# Patient Record
Sex: MALE | Race: WHITE | NOT HISPANIC OR LATINO | Employment: OTHER | URBAN - METROPOLITAN AREA
[De-identification: names, ages, dates, MRNs, and addresses within clinical notes are randomized per-mention and may not be internally consistent; named-entity substitution may affect disease eponyms.]

---

## 2018-12-10 ENCOUNTER — OFFICE VISIT (OUTPATIENT)
Dept: FAMILY MEDICINE CLINIC | Facility: CLINIC | Age: 28
End: 2018-12-10
Payer: COMMERCIAL

## 2018-12-10 VITALS
BODY MASS INDEX: 37.53 KG/M2 | SYSTOLIC BLOOD PRESSURE: 128 MMHG | HEART RATE: 89 BPM | OXYGEN SATURATION: 98 % | WEIGHT: 253.4 LBS | DIASTOLIC BLOOD PRESSURE: 82 MMHG | HEIGHT: 69 IN | TEMPERATURE: 98.3 F | RESPIRATION RATE: 16 BRPM

## 2018-12-10 DIAGNOSIS — M54.50 CHRONIC BILATERAL LOW BACK PAIN WITHOUT SCIATICA: ICD-10-CM

## 2018-12-10 DIAGNOSIS — G89.29 CHRONIC BILATERAL LOW BACK PAIN WITHOUT SCIATICA: ICD-10-CM

## 2018-12-10 DIAGNOSIS — F51.01 PRIMARY INSOMNIA: ICD-10-CM

## 2018-12-10 DIAGNOSIS — Z00.00 WELL ADULT EXAM: Primary | ICD-10-CM

## 2018-12-10 DIAGNOSIS — J30.2 SEASONAL ALLERGIES: ICD-10-CM

## 2018-12-10 DIAGNOSIS — Z23 IMMUNIZATION DUE: ICD-10-CM

## 2018-12-10 PROBLEM — R79.1 SUBTHERAPEUTIC INTERNATIONAL NORMALIZED RATIO (INR): Status: ACTIVE | Noted: 2018-12-10

## 2018-12-10 PROBLEM — F70 MILDLY MENTALLY RETARDED: Status: ACTIVE | Noted: 2018-12-10

## 2018-12-10 PROBLEM — R79.1 SUBTHERAPEUTIC INTERNATIONAL NORMALIZED RATIO (INR): Status: RESOLVED | Noted: 2018-12-10 | Resolved: 2018-12-10

## 2018-12-10 PROCEDURE — 90471 IMMUNIZATION ADMIN: CPT

## 2018-12-10 PROCEDURE — 3008F BODY MASS INDEX DOCD: CPT | Performed by: NURSE PRACTITIONER

## 2018-12-10 PROCEDURE — 4004F PT TOBACCO SCREEN RCVD TLK: CPT | Performed by: NURSE PRACTITIONER

## 2018-12-10 PROCEDURE — 90682 RIV4 VACC RECOMBINANT DNA IM: CPT

## 2018-12-10 PROCEDURE — 99214 OFFICE O/P EST MOD 30 MIN: CPT | Performed by: NURSE PRACTITIONER

## 2018-12-10 RX ORDER — MAGNESIUM OXIDE 500 MG
5 TABLET ORAL
COMMUNITY
End: 2018-12-10 | Stop reason: SDUPTHER

## 2018-12-10 RX ORDER — LORATADINE 10 MG/1
10 TABLET ORAL DAILY
Qty: 30 TABLET | Refills: 5 | Status: SHIPPED | OUTPATIENT
Start: 2018-12-10 | End: 2018-12-10 | Stop reason: SDUPTHER

## 2018-12-10 RX ORDER — LORATADINE 10 MG/1
10 TABLET ORAL DAILY
Qty: 30 TABLET | Refills: 0 | Status: SHIPPED | OUTPATIENT
Start: 2018-12-10 | End: 2018-12-11 | Stop reason: SDUPTHER

## 2018-12-10 RX ORDER — LORATADINE 10 MG/1
10 TABLET ORAL DAILY
COMMUNITY
End: 2018-12-10 | Stop reason: SDUPTHER

## 2018-12-10 RX ORDER — MAGNESIUM OXIDE 500 MG
5 TABLET ORAL
Qty: 45 TABLET | Refills: 0 | Status: SHIPPED | OUTPATIENT
Start: 2018-12-10 | End: 2018-12-11 | Stop reason: SDUPTHER

## 2018-12-10 RX ORDER — MAGNESIUM OXIDE 500 MG
5 TABLET ORAL
Qty: 45 TABLET | Refills: 1 | Status: SHIPPED | OUTPATIENT
Start: 2018-12-10 | End: 2018-12-10 | Stop reason: SDUPTHER

## 2018-12-10 NOTE — ASSESSMENT & PLAN NOTE
Has occasional lower back pain that is treatable with Advil    Instructed to take the Advil with food and only as needed every 8 hours

## 2018-12-10 NOTE — ASSESSMENT & PLAN NOTE
Obese adult male who is mentally impaired (mild)  Apical is regular, lungs are clear  Guardians were instructed to have him follow a low-fat low-carbohydrate diet  Increase his activity level  Seek dental care and Vision [facility]

## 2018-12-10 NOTE — ASSESSMENT & PLAN NOTE
Able to answer questions appropriately but immature for his age  Unable to live independent because decision making is poor

## 2018-12-10 NOTE — PROGRESS NOTES
Assessment/Plan:    Well adult exam  Obese adult male who is mentally impaired (mild)  Apical is regular, lungs are clear  Guardians were instructed to have him follow a low-fat low-carbohydrate diet  Increase his activity level  Seek dental care and Vision Care  Mildly mentally retarded  Able to answer questions appropriately but immature for his age  Unable to live independent because decision making is poor  Primary insomnia  Insomnia because he states he cannot turn his mind off will order melatonin 5 mg at bedtime p r n  Seasonal allergies  Symptoms of seasonal allergies are cough and nasal congestion  Will renew Claritin 10 mg to be taken daily  Chronic bilateral low back pain without sciatica  Has occasional lower back pain that is treatable with Advil  Instructed to take the Advil with food and only as needed every 8 hours    Immunization due  Will give flu shot today       Diagnoses and all orders for this visit:    Well adult exam    Chronic bilateral low back pain without sciatica  -     Discontinue: ibuprofen (ADVIL,MOTRIN) 100 MG tablet; Take 1 tablet (100 mg total) by mouth every 8 (eight) hours as needed for moderate pain  -     ibuprofen (ADVIL,MOTRIN) 100 MG tablet; Take 1 tablet (100 mg total) by mouth every 8 (eight) hours as needed for moderate pain    Seasonal allergies  -     Discontinue: loratadine (CLARITIN) 10 mg tablet; Take 1 tablet (10 mg total) by mouth daily  -     loratadine (CLARITIN) 10 mg tablet; Take 1 tablet (10 mg total) by mouth daily    Primary insomnia  -     Discontinue: Melatonin ER 5 MG TBCR; Take 5 mg by mouth daily at bedtime as needed (prn) May repeat dose in one hour if needed  -     Melatonin ER 5 MG TBCR; Take 5 mg by mouth daily at bedtime as needed (prn) May repeat dose in one hour if needed      Immunization due  -     PREFERRED: influenza vaccine, [id number], quadrivalent, recombinant, PF, 0 5 mL (FLUBLOK)    Other orders  -     Discontinue: ibuprofen (ADVIL,MOTRIN) 100 MG tablet; Take 100 mg by mouth every 6 (six) hours as needed  -     Discontinue: loratadine (CLARITIN) 10 mg tablet; Take 10 mg by mouth daily  -     Discontinue: Melatonin ER 5 MG TBCR; Take 5 mg by mouth daily at bedtime as needed          Subjective:      Patient ID: Nciky Mccormick is a 24 y o  male here for physical     HPI  Here for a yearly physical  States he has lower back pain at times  Has seasonal allergies and insomnia  The following portions of the patient's history were reviewed and updated as appropriate:   He  has a past medical history of Allergic; Development delay; and Obesity  He   Patient Active Problem List    Diagnosis Date Noted    Well adult exam 12/10/2018    Primary insomnia 12/10/2018    Chronic bilateral low back pain without sciatica 12/10/2018    Seasonal allergies 12/10/2018    Mildly mentally retarded 12/10/2018    Immunization due 12/10/2018     He  has a past surgical history that includes EAR SURGERY  His family history is not on file  He  reports that he has been smoking  He has been smoking about 1 00 pack per day  He has never used smokeless tobacco  He reports that he does not drink alcohol or use drugs  Current Outpatient Prescriptions   Medication Sig Dispense Refill    ibuprofen (ADVIL,MOTRIN) 100 MG tablet Take 1 tablet (100 mg total) by mouth every 8 (eight) hours as needed for moderate pain 50 tablet 0    loratadine (CLARITIN) 10 mg tablet Take 1 tablet (10 mg total) by mouth daily 30 tablet 0    Melatonin ER 5 MG TBCR Take 5 mg by mouth daily at bedtime as needed (prn) May repeat dose in one hour if needed  45 tablet 0     No current facility-administered medications for this visit  No current outpatient prescriptions on file prior to visit  No current facility-administered medications on file prior to visit  He is allergic to seasonal ic [cholestatin] and penicillins       Review of Systems Constitutional: Negative  HENT:        Allergies cause cough, congestion  Eyes: Positive for visual disturbance (lost glasses  )  Respiratory: Negative  Cardiovascular: Negative  Gastrointestinal: Negative  Genitourinary: Negative  Musculoskeletal: Positive for back pain  Skin: Negative  Allergic/Immunologic: Positive for environmental allergies  Neurological: Positive for headaches (occasional)  Hematological: Negative  Psychiatric/Behavioral: Positive for decreased concentration, dysphoric mood and sleep disturbance  The patient is nervous/anxious  Objective:      /82   Pulse 89   Temp 98 3 °F (36 8 °C) (Tympanic)   Resp 16   Ht 5' 9" (1 753 m)   Wt 115 kg (253 lb 6 4 oz)   SpO2 98%   BMI 37 42 kg/m²          Physical Exam   Constitutional: He is oriented to person, place, and time  He appears well-developed and well-nourished  HENT:   Head: Normocephalic  Right Ear: External ear normal    Left Ear: External ear normal    Nose: Nose normal    Mouth/Throat: Oropharynx is clear and moist    Multiple teeth are rotted to the gumline, poor dental hygiene   Eyes: Pupils are equal, round, and reactive to light  Conjunctivae and EOM are normal    Neck: Normal range of motion  Neck supple  No thyromegaly present  Cardiovascular: Normal rate, normal heart sounds and intact distal pulses  Pulmonary/Chest: Effort normal and breath sounds normal    Abdominal: Soft  Musculoskeletal: Normal range of motion  Lymphadenopathy:     He has no cervical adenopathy  Neurological: He is alert and oriented to person, place, and time  He has normal reflexes  Skin: Skin is warm and dry  Psychiatric: Judgment and thought content normal  His affect is inappropriate  His speech is delayed  He is slowed   Cognition and memory are normal    Mildly mentally impaired adult male

## 2018-12-10 NOTE — ASSESSMENT & PLAN NOTE
Symptoms of seasonal allergies are cough and nasal congestion  Will renew Claritin 10 mg to be taken daily

## 2018-12-11 DIAGNOSIS — F51.01 PRIMARY INSOMNIA: ICD-10-CM

## 2018-12-11 DIAGNOSIS — M54.50 CHRONIC BILATERAL LOW BACK PAIN WITHOUT SCIATICA: ICD-10-CM

## 2018-12-11 DIAGNOSIS — J30.2 SEASONAL ALLERGIES: ICD-10-CM

## 2018-12-11 DIAGNOSIS — G89.29 CHRONIC BILATERAL LOW BACK PAIN WITHOUT SCIATICA: ICD-10-CM

## 2018-12-12 ENCOUNTER — TELEPHONE (OUTPATIENT)
Dept: FAMILY MEDICINE CLINIC | Facility: CLINIC | Age: 28
End: 2018-12-12

## 2018-12-12 DIAGNOSIS — F51.01 PRIMARY INSOMNIA: ICD-10-CM

## 2018-12-12 DIAGNOSIS — G89.29 CHRONIC BILATERAL LOW BACK PAIN WITHOUT SCIATICA: ICD-10-CM

## 2018-12-12 DIAGNOSIS — J30.2 SEASONAL ALLERGIES: ICD-10-CM

## 2018-12-12 DIAGNOSIS — M54.50 CHRONIC BILATERAL LOW BACK PAIN WITHOUT SCIATICA: ICD-10-CM

## 2018-12-12 RX ORDER — LORATADINE 10 MG/1
10 TABLET ORAL DAILY
Qty: 30 TABLET | Refills: 0 | Status: SHIPPED | OUTPATIENT
Start: 2018-12-12 | End: 2019-01-08 | Stop reason: SDUPTHER

## 2018-12-12 RX ORDER — LORATADINE 10 MG/1
10 TABLET ORAL DAILY
Qty: 30 TABLET | Refills: 5 | Status: SHIPPED | OUTPATIENT
Start: 2018-12-12 | End: 2018-12-12 | Stop reason: SDUPTHER

## 2018-12-12 RX ORDER — MAGNESIUM OXIDE 500 MG
5 TABLET ORAL
Qty: 45 TABLET | Refills: 0 | Status: SHIPPED | OUTPATIENT
Start: 2018-12-12 | End: 2018-12-12 | Stop reason: SDUPTHER

## 2018-12-12 RX ORDER — MAGNESIUM OXIDE 500 MG
5 TABLET ORAL
Qty: 45 TABLET | Refills: 0 | Status: SHIPPED | OUTPATIENT
Start: 2018-12-12 | End: 2021-04-15 | Stop reason: HOSPADM

## 2018-12-12 RX ORDER — MAGNESIUM OXIDE 500 MG
5 TABLET ORAL
Qty: 45 TABLET | Refills: 1 | Status: SHIPPED | OUTPATIENT
Start: 2018-12-12 | End: 2018-12-12 | Stop reason: SDUPTHER

## 2018-12-12 RX ORDER — LORATADINE 10 MG/1
10 TABLET ORAL DAILY
Qty: 30 TABLET | Refills: 0 | Status: SHIPPED | OUTPATIENT
Start: 2018-12-12 | End: 2018-12-12 | Stop reason: SDUPTHER

## 2018-12-12 NOTE — TELEPHONE ENCOUNTER
Call from [facility]   As of today [facility] pharmacy has not yet received any of his scripts    They are asking these be filled today

## 2019-01-08 DIAGNOSIS — J30.2 SEASONAL ALLERGIES: ICD-10-CM

## 2019-01-09 DIAGNOSIS — J30.2 SEASONAL ALLERGIES: ICD-10-CM

## 2019-01-09 RX ORDER — LORATADINE 10 MG/1
10 TABLET ORAL DAILY
Qty: 30 TABLET | Refills: 0 | Status: SHIPPED | OUTPATIENT
Start: 2019-01-09 | End: 2021-04-15 | Stop reason: HOSPADM

## 2019-01-09 RX ORDER — LORATADINE 10 MG/1
TABLET ORAL
Qty: 30 TABLET | Refills: 0 | Status: SHIPPED | OUTPATIENT
Start: 2019-01-09 | End: 2021-04-15 | Stop reason: HOSPADM

## 2020-05-18 ENCOUNTER — HOSPITAL ENCOUNTER (EMERGENCY)
Facility: HOSPITAL | Age: 30
Discharge: HOME | End: 2020-05-19
Attending: EMERGENCY MEDICINE | Admitting: EMERGENCY MEDICINE
Payer: COMMERCIAL

## 2020-05-18 DIAGNOSIS — F15.10 METHAMPHETAMINE ABUSE (HCC): ICD-10-CM

## 2020-05-18 DIAGNOSIS — F19.10 SUBSTANCE ABUSE (HCC): Primary | ICD-10-CM

## 2020-05-18 LAB — ETHANOL EXG-MCNC: 0 MG/DL

## 2020-05-18 PROCEDURE — 99282 EMERGENCY DEPT VISIT SF MDM: CPT | Performed by: EMERGENCY MEDICINE

## 2020-05-18 PROCEDURE — 82075 ASSAY OF BREATH ETHANOL: CPT

## 2020-05-18 PROCEDURE — 99285 EMERGENCY DEPT VISIT HI MDM: CPT

## 2020-05-18 RX ORDER — BUSPIRONE HYDROCHLORIDE 30 MG/1
30 TABLET ORAL 3 TIMES DAILY
Status: ON HOLD | COMMUNITY
End: 2021-04-12 | Stop reason: ALTCHOICE

## 2020-05-18 RX ORDER — QUETIAPINE FUMARATE 400 MG/1
400 TABLET, FILM COATED ORAL
COMMUNITY

## 2020-05-19 VITALS
SYSTOLIC BLOOD PRESSURE: 97 MMHG | OXYGEN SATURATION: 97 % | RESPIRATION RATE: 14 BRPM | DIASTOLIC BLOOD PRESSURE: 58 MMHG | TEMPERATURE: 98 F | HEART RATE: 64 BPM

## 2020-05-19 PROCEDURE — NC001 PR NO CHARGE: Performed by: PHYSICIAN ASSISTANT

## 2021-04-12 ENCOUNTER — TELEPHONE (OUTPATIENT)
Dept: FAMILY MEDICINE CLINIC | Facility: CLINIC | Age: 31
End: 2021-04-12

## 2021-04-12 ENCOUNTER — APPOINTMENT (EMERGENCY)
Dept: RADIOLOGY | Facility: HOSPITAL | Age: 31
DRG: 816 | End: 2021-04-12
Payer: COMMERCIAL

## 2021-04-12 ENCOUNTER — HOSPITAL ENCOUNTER (INPATIENT)
Facility: HOSPITAL | Age: 31
LOS: 3 days | Discharge: OTHER INSTITUTION | DRG: 816 | End: 2021-04-15
Attending: EMERGENCY MEDICINE | Admitting: EMERGENCY MEDICINE
Payer: COMMERCIAL

## 2021-04-12 DIAGNOSIS — R79.89 ELEVATED LFTS: ICD-10-CM

## 2021-04-12 DIAGNOSIS — F11.90 OPIOID USE DISORDER: Primary | ICD-10-CM

## 2021-04-12 DIAGNOSIS — T50.901A OVERDOSE: ICD-10-CM

## 2021-04-12 DIAGNOSIS — R74.01 TRANSAMINITIS: ICD-10-CM

## 2021-04-12 DIAGNOSIS — G47.00 INSOMNIA, UNSPECIFIED TYPE: ICD-10-CM

## 2021-04-12 DIAGNOSIS — F41.9 ANXIETY: ICD-10-CM

## 2021-04-12 DIAGNOSIS — F19.10 SUBSTANCE ABUSE (HCC): ICD-10-CM

## 2021-04-12 DIAGNOSIS — R76.8 HEPATITIS C ANTIBODY TEST POSITIVE: ICD-10-CM

## 2021-04-12 PROBLEM — S09.90XA HEAD INJURY: Status: ACTIVE | Noted: 2021-04-12

## 2021-04-12 PROBLEM — F11.23 OPIOID WITHDRAWAL (HCC): Status: ACTIVE | Noted: 2021-04-12

## 2021-04-12 PROBLEM — F19.90 SUBSTANCE USE DISORDER: Status: ACTIVE | Noted: 2021-04-12

## 2021-04-12 PROBLEM — T40.2X1A OPIOID OVERDOSE (HCC): Status: ACTIVE | Noted: 2021-04-12

## 2021-04-12 LAB
ALBUMIN SERPL BCP-MCNC: 3.1 G/DL (ref 3–5.2)
ALBUMIN SERPL BCP-MCNC: 3.4 G/DL (ref 3–5.2)
ALBUMIN SERPL BCP-MCNC: 4.1 G/DL (ref 3–5.2)
ALP SERPL-CCNC: 62 U/L (ref 43–122)
ALP SERPL-CCNC: 74 U/L (ref 43–122)
ALP SERPL-CCNC: 79 U/L (ref 43–122)
ALT SERPL W P-5'-P-CCNC: 397 U/L
ALT SERPL W P-5'-P-CCNC: 451 U/L
ALT SERPL W P-5'-P-CCNC: 569 U/L
ANION GAP SERPL CALCULATED.3IONS-SCNC: 3 MMOL/L (ref 5–14)
ANION GAP SERPL CALCULATED.3IONS-SCNC: 4 MMOL/L (ref 5–14)
ANION GAP SERPL CALCULATED.3IONS-SCNC: 7 MMOL/L (ref 5–14)
APAP SERPL-MCNC: <10 UG/ML (ref 10–20)
AST SERPL W P-5'-P-CCNC: 201 U/L (ref 17–59)
AST SERPL W P-5'-P-CCNC: 263 U/L (ref 17–59)
AST SERPL W P-5'-P-CCNC: 457 U/L (ref 17–59)
ATRIAL RATE: 83 BPM
BASOPHILS # BLD AUTO: 0 THOUSANDS/ΜL (ref 0–0.1)
BASOPHILS # BLD AUTO: 0.1 THOUSANDS/ΜL (ref 0–0.1)
BASOPHILS NFR BLD AUTO: 0 % (ref 0–1)
BASOPHILS NFR BLD AUTO: 1 % (ref 0–1)
BILIRUB SERPL-MCNC: 0.95 MG/DL
BILIRUB SERPL-MCNC: 1.54 MG/DL
BILIRUB SERPL-MCNC: 2.77 MG/DL
BUN SERPL-MCNC: 12 MG/DL (ref 5–25)
BUN SERPL-MCNC: 12 MG/DL (ref 5–25)
BUN SERPL-MCNC: 13 MG/DL (ref 5–25)
CALCIUM ALBUM COR SERPL-MCNC: 9.2 MG/DL (ref 8.3–10.1)
CALCIUM ALBUM COR SERPL-MCNC: 9.5 MG/DL (ref 8.3–10.1)
CALCIUM SERPL-MCNC: 8.5 MG/DL (ref 8.4–10.2)
CALCIUM SERPL-MCNC: 8.8 MG/DL (ref 8.4–10.2)
CALCIUM SERPL-MCNC: 9 MG/DL (ref 8.4–10.2)
CHLORIDE SERPL-SCNC: 102 MMOL/L (ref 97–108)
CHLORIDE SERPL-SCNC: 104 MMOL/L (ref 97–108)
CHLORIDE SERPL-SCNC: 98 MMOL/L (ref 97–108)
CK MB SERPL-MCNC: 10.6 NG/ML (ref 0–2.4)
CK MB SERPL-MCNC: <1 % (ref 0–2.5)
CK SERPL-CCNC: 1134 U/L (ref 55–170)
CO2 SERPL-SCNC: 30 MMOL/L (ref 22–30)
CO2 SERPL-SCNC: 30 MMOL/L (ref 22–30)
CO2 SERPL-SCNC: 32 MMOL/L (ref 22–30)
CREAT SERPL-MCNC: 0.78 MG/DL (ref 0.7–1.5)
CREAT SERPL-MCNC: 0.88 MG/DL (ref 0.7–1.5)
CREAT SERPL-MCNC: 0.96 MG/DL (ref 0.7–1.5)
EOSINOPHIL # BLD AUTO: 0.1 THOUSAND/ΜL (ref 0–0.4)
EOSINOPHIL # BLD AUTO: 0.2 THOUSAND/ΜL (ref 0–0.4)
EOSINOPHIL NFR BLD AUTO: 2 % (ref 0–6)
EOSINOPHIL NFR BLD AUTO: 2 % (ref 0–6)
ERYTHROCYTE [DISTWIDTH] IN BLOOD BY AUTOMATED COUNT: 14.6 %
ERYTHROCYTE [DISTWIDTH] IN BLOOD BY AUTOMATED COUNT: 14.6 %
GFR SERPL CREATININE-BSD FRML MDRD: 107 ML/MIN/1.73SQ M
GFR SERPL CREATININE-BSD FRML MDRD: 117 ML/MIN/1.73SQ M
GFR SERPL CREATININE-BSD FRML MDRD: 123 ML/MIN/1.73SQ M
GLUCOSE SERPL-MCNC: 105 MG/DL (ref 70–99)
GLUCOSE SERPL-MCNC: 117 MG/DL (ref 70–99)
GLUCOSE SERPL-MCNC: 92 MG/DL (ref 70–99)
HCT VFR BLD AUTO: 38 % (ref 41–53)
HCT VFR BLD AUTO: 40.6 % (ref 41–53)
HGB BLD-MCNC: 12.7 G/DL (ref 13.5–17.5)
HGB BLD-MCNC: 13.9 G/DL (ref 13.5–17.5)
HIV 1+2 AB+HIV1 P24 AG SERPL QL IA: NORMAL
HIV1 P24 AG SER QL: NORMAL
INR PPP: 1.07 (ref 0.84–1.19)
INR PPP: 1.12 (ref 0.84–1.19)
LYMPHOCYTES # BLD AUTO: 1.3 THOUSANDS/ΜL (ref 0.5–4)
LYMPHOCYTES # BLD AUTO: 1.4 THOUSANDS/ΜL (ref 0.5–4)
LYMPHOCYTES NFR BLD AUTO: 15 % (ref 25–45)
LYMPHOCYTES NFR BLD AUTO: 17 % (ref 25–45)
MCH RBC QN AUTO: 30.6 PG (ref 26–34)
MCH RBC QN AUTO: 31.1 PG (ref 26–34)
MCHC RBC AUTO-ENTMCNC: 33.5 G/DL (ref 31–36)
MCHC RBC AUTO-ENTMCNC: 34.2 G/DL (ref 31–36)
MCV RBC AUTO: 91 FL (ref 80–100)
MCV RBC AUTO: 91 FL (ref 80–100)
MONOCYTES # BLD AUTO: 1 THOUSAND/ΜL (ref 0.2–0.9)
MONOCYTES # BLD AUTO: 1 THOUSAND/ΜL (ref 0.2–0.9)
MONOCYTES NFR BLD AUTO: 12 % (ref 1–10)
MONOCYTES NFR BLD AUTO: 12 % (ref 1–10)
NEUTROPHILS # BLD AUTO: 5.7 THOUSANDS/ΜL (ref 1.8–7.8)
NEUTROPHILS # BLD AUTO: 6.2 THOUSANDS/ΜL (ref 1.8–7.8)
NEUTS SEG NFR BLD AUTO: 69 % (ref 45–65)
NEUTS SEG NFR BLD AUTO: 71 % (ref 45–65)
P AXIS: 55 DEGREES
PLATELET # BLD AUTO: 193 THOUSANDS/UL (ref 150–450)
PLATELET # BLD AUTO: 211 THOUSANDS/UL (ref 150–450)
PMV BLD AUTO: 8.6 FL (ref 8.9–12.7)
PMV BLD AUTO: 8.6 FL (ref 8.9–12.7)
POTASSIUM SERPL-SCNC: 3.9 MMOL/L (ref 3.6–5)
POTASSIUM SERPL-SCNC: 4.1 MMOL/L (ref 3.6–5)
POTASSIUM SERPL-SCNC: 4.2 MMOL/L (ref 3.6–5)
PR INTERVAL: 130 MS
PROT SERPL-MCNC: 6 G/DL (ref 5.9–8.4)
PROT SERPL-MCNC: 6.1 G/DL (ref 5.9–8.4)
PROT SERPL-MCNC: 7.8 G/DL (ref 5.9–8.4)
PROTHROMBIN TIME: 14 SECONDS (ref 11.6–14.5)
PROTHROMBIN TIME: 14.5 SECONDS (ref 11.6–14.5)
QRS AXIS: 60 DEGREES
QRSD INTERVAL: 84 MS
QT INTERVAL: 352 MS
QTC INTERVAL: 413 MS
RBC # BLD AUTO: 4.15 MILLION/UL (ref 4.5–5.9)
RBC # BLD AUTO: 4.47 MILLION/UL (ref 4.5–5.9)
SALICYLATES SERPL-MCNC: <1 MG/DL (ref 10–30)
SODIUM SERPL-SCNC: 135 MMOL/L (ref 137–147)
SODIUM SERPL-SCNC: 137 MMOL/L (ref 137–147)
SODIUM SERPL-SCNC: 138 MMOL/L (ref 137–147)
T WAVE AXIS: 28 DEGREES
TROPONIN I SERPL-MCNC: 0.02 NG/ML (ref 0–0.03)
VENTRICULAR RATE: 83 BPM
WBC # BLD AUTO: 8.4 THOUSAND/UL (ref 4.5–11)
WBC # BLD AUTO: 8.7 THOUSAND/UL (ref 4.5–11)

## 2021-04-12 PROCEDURE — 80179 DRUG ASSAY SALICYLATE: CPT | Performed by: EMERGENCY MEDICINE

## 2021-04-12 PROCEDURE — 93005 ELECTROCARDIOGRAM TRACING: CPT

## 2021-04-12 PROCEDURE — 85025 COMPLETE CBC W/AUTO DIFF WBC: CPT | Performed by: EMERGENCY MEDICINE

## 2021-04-12 PROCEDURE — 82553 CREATINE MB FRACTION: CPT | Performed by: EMERGENCY MEDICINE

## 2021-04-12 PROCEDURE — 85610 PROTHROMBIN TIME: CPT | Performed by: EMERGENCY MEDICINE

## 2021-04-12 PROCEDURE — 99284 EMERGENCY DEPT VISIT MOD MDM: CPT | Performed by: EMERGENCY MEDICINE

## 2021-04-12 PROCEDURE — 99291 CRITICAL CARE FIRST HOUR: CPT | Performed by: PHYSICIAN ASSISTANT

## 2021-04-12 PROCEDURE — 82550 ASSAY OF CK (CPK): CPT | Performed by: EMERGENCY MEDICINE

## 2021-04-12 PROCEDURE — 93010 ELECTROCARDIOGRAM REPORT: CPT | Performed by: INTERNAL MEDICINE

## 2021-04-12 PROCEDURE — 99285 EMERGENCY DEPT VISIT HI MDM: CPT

## 2021-04-12 PROCEDURE — 87806 HIV AG W/HIV1&2 ANTB W/OPTIC: CPT | Performed by: PHYSICIAN ASSISTANT

## 2021-04-12 PROCEDURE — 80074 ACUTE HEPATITIS PANEL: CPT | Performed by: EMERGENCY MEDICINE

## 2021-04-12 PROCEDURE — 84484 ASSAY OF TROPONIN QUANT: CPT | Performed by: EMERGENCY MEDICINE

## 2021-04-12 PROCEDURE — NC001 PR NO CHARGE: Performed by: PHYSICIAN ASSISTANT

## 2021-04-12 PROCEDURE — 71045 X-RAY EXAM CHEST 1 VIEW: CPT

## 2021-04-12 PROCEDURE — 80143 DRUG ASSAY ACETAMINOPHEN: CPT | Performed by: EMERGENCY MEDICINE

## 2021-04-12 PROCEDURE — 80053 COMPREHEN METABOLIC PANEL: CPT | Performed by: EMERGENCY MEDICINE

## 2021-04-12 PROCEDURE — 36415 COLL VENOUS BLD VENIPUNCTURE: CPT | Performed by: EMERGENCY MEDICINE

## 2021-04-12 RX ORDER — NALOXONE HYDROCHLORIDE 1 MG/ML
3 INJECTION PARENTERAL ONCE
Status: COMPLETED | OUTPATIENT
Start: 2021-04-12 | End: 2021-04-12

## 2021-04-12 RX ORDER — SODIUM CHLORIDE 9 MG/ML
125 INJECTION, SOLUTION INTRAVENOUS CONTINUOUS
Status: DISCONTINUED | OUTPATIENT
Start: 2021-04-12 | End: 2021-04-14

## 2021-04-12 RX ORDER — IBUPROFEN 400 MG/1
600 TABLET ORAL EVERY 6 HOURS PRN
Status: DISCONTINUED | OUTPATIENT
Start: 2021-04-12 | End: 2021-04-15 | Stop reason: HOSPADM

## 2021-04-12 RX ORDER — GABAPENTIN 300 MG/1
300 CAPSULE ORAL EVERY 8 HOURS PRN
Status: DISCONTINUED | OUTPATIENT
Start: 2021-04-12 | End: 2021-04-15 | Stop reason: HOSPADM

## 2021-04-12 RX ORDER — BUSPIRONE HYDROCHLORIDE 15 MG/1
15 TABLET ORAL 2 TIMES DAILY
COMMUNITY
End: 2021-04-15 | Stop reason: HOSPADM

## 2021-04-12 RX ORDER — ONDANSETRON 4 MG/1
4 TABLET, ORALLY DISINTEGRATING ORAL EVERY 6 HOURS PRN
Status: DISCONTINUED | OUTPATIENT
Start: 2021-04-12 | End: 2021-04-15 | Stop reason: HOSPADM

## 2021-04-12 RX ORDER — CLONIDINE HYDROCHLORIDE 0.1 MG/1
0.1 TABLET ORAL EVERY 6 HOURS PRN
Status: DISCONTINUED | OUTPATIENT
Start: 2021-04-12 | End: 2021-04-15 | Stop reason: HOSPADM

## 2021-04-12 RX ORDER — ALPRAZOLAM 0.5 MG/1
0.5 TABLET ORAL DAILY
Status: DISCONTINUED | OUTPATIENT
Start: 2021-04-12 | End: 2021-04-12

## 2021-04-12 RX ORDER — IBUPROFEN 400 MG/1
400 TABLET ORAL EVERY 4 HOURS PRN
Status: DISCONTINUED | OUTPATIENT
Start: 2021-04-12 | End: 2021-04-12

## 2021-04-12 RX ORDER — ALPRAZOLAM 0.5 MG/1
0.5 TABLET ORAL 2 TIMES DAILY
Status: DISCONTINUED | OUTPATIENT
Start: 2021-04-12 | End: 2021-04-15 | Stop reason: HOSPADM

## 2021-04-12 RX ORDER — TRAZODONE HYDROCHLORIDE 100 MG/1
10 TABLET ORAL
Status: ON HOLD | COMMUNITY
End: 2021-04-15 | Stop reason: SDUPTHER

## 2021-04-12 RX ADMIN — ALPRAZOLAM 0.5 MG: 0.5 TABLET ORAL at 21:22

## 2021-04-12 RX ADMIN — ACETYLCYSTEINE 5000 MG: 6 INJECTION, SOLUTION INTRAVENOUS at 12:36

## 2021-04-12 RX ADMIN — IBUPROFEN 600 MG: 400 TABLET ORAL at 07:52

## 2021-04-12 RX ADMIN — ACETYLCYSTEINE 10000 MG: 6 INJECTION, SOLUTION INTRAVENOUS at 17:11

## 2021-04-12 RX ADMIN — IBUPROFEN 600 MG: 400 TABLET ORAL at 16:41

## 2021-04-12 RX ADMIN — SODIUM CHLORIDE 125 ML/HR: 0.9 INJECTION, SOLUTION INTRAVENOUS at 21:29

## 2021-04-12 RX ADMIN — SODIUM CHLORIDE 1000 ML: 0.9 INJECTION, SOLUTION INTRAVENOUS at 04:07

## 2021-04-12 RX ADMIN — SODIUM CHLORIDE 125 ML/HR: 0.9 INJECTION, SOLUTION INTRAVENOUS at 14:07

## 2021-04-12 RX ADMIN — ACETYLCYSTEINE 15000 MG: 6 INJECTION, SOLUTION INTRAVENOUS at 11:28

## 2021-04-12 RX ADMIN — ONDANSETRON 4 MG: 4 TABLET, ORALLY DISINTEGRATING ORAL at 16:42

## 2021-04-12 RX ADMIN — IBUPROFEN 600 MG: 400 TABLET ORAL at 23:15

## 2021-04-12 RX ADMIN — ONDANSETRON 4 MG: 4 TABLET, ORALLY DISINTEGRATING ORAL at 10:49

## 2021-04-12 RX ADMIN — SODIUM CHLORIDE 1000 ML: 0.9 INJECTION, SOLUTION INTRAVENOUS at 12:47

## 2021-04-12 RX ADMIN — ALPRAZOLAM 0.5 MG: 0.25 TABLET ORAL at 10:49
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
## 2021-04-12 NOTE — DISCHARGE INSTR - OTHER ORDERS
HOW TO GET SUBSTANCE ABUSE HELP:  If you or someone you know has a drug or alcohol problem, there is help:  Carmela [address]: [address]: [phone]  An assessment is the first step  In addition to those listed there are other programs available in the area but assessment is best to determine an appropriate level of care  If you DO NOT have Medical Assistance (MA) or [employer], an assessment can be scheduled at one of these providers:  [address] [facility] [place] [address], [address] [place]  [phone]   101 [facility] [address] , [place] [address] [place]  3314 [facility] [address]  Kim [address] [place], [address]   Step by [address] [facility] [address] , [place], 98 [facility] Alexis Geoff [address]20 [facility] , [place] 98 [facility]  2000 [facility] [address] , [address], [place], [address] [place] [phone]     If you [address], an assessment can be scheduled at one of these providers:  [facility]  [address] , [place], 98 [facility]  [address]  [address], [address] [place]  [address]  D&A Intake Unit  620 [facility] [address] , 1st Floor, [place], [address]  [address], 300 [facility],6Th Floor, [place], 4420 [facility] [place] [address] [facility] [address] , [place], [address] [place]  3314 [facility] [address]  [place], 122 [facility]  57 [facility], [place], [address] 75 [facility] [address] [place], [address]   Step by [facility] 65 [facility] Valente WINTER'Venus Garcia , [place], 98 [facility] [phone]   [facility]  [address] , [place], 98 [facility]  2000 [facility],[address] 100 [facility] [place] , [address], [place], [address] [facility] [phone]     If you [facility], an assessment can be scheduled at one of these providers  Please contact these Providers to determine if they are in your network plan:  [facility] D&A Intake Unit  620 [facility] [address] , 1st Floor, [place], [address]  [place] [address]ert[address] , [place], [address] [place]  [facility] [address]  [place], 122 [facility]) [facility] [address], [place], [address] 75 [facility] [place] [address] [facility] [address] , [address], [place], [address] [place] 200 [facility] Crisis Phone Number : 401 [address] [place] [address] Crisis Phone Number : [phone]    [place] Suicide Hotline : 1 [phone]    Crisis Text Line : 352823          What you need to know aboutcoronavirus disease 2019 (COVID-19)     What is coronavirus disease 2019 (COVID-19)? Coronavirus disease 2019 (COVID-19) is a respiratory illness that can spread from person to person  The virus that causes COVID-19 is a novel coronavirus that was first identified during an investigation into an outbreak in [place], [place]  Can people in [place]  get COVID-19? Yes  COVID-19 is spreading from person to person in parts of the [place]  Risk of infection with COVID-19 is higher for people who are close contacts of someone known to have COVID-19, for example healthcare workers, or household members  Other people at higher risk for infection are those who live in or have recently been in an area with ongoing spread of COVID-19   Learn more about places with ongoing spread at [facility]  html#geographic  Have there been cases of COVID-19 in [place] ?   Yes  The first case of COVID-19 in the [place] was reported on January 21, 2020  The current count of cases of COVID-19 in the [place] is available on [employer] at [facility]  How does COVID-19 spread? The virus that causes COVID-19 probably emerged from an animal source, but is now spreading from person to person  The virus is thought to spread mainly between people who are in close contact with one another (within about 6 feet) through respiratory droplets produced when an infected person coughs or sneezes  It also may be possible that a person can get COVID-19 by touching a surface or object that has the virus on it and then touching their own mouth, nose, or possibly their eyes, but this is not thought to be the main way the virus spreads  Learn what is known about the spread of newly emerged coronaviruses at [facility]  What are the symptoms of COVID-19? Patients with COVID-19 have had mild to severe respiratory illness with symptoms of   fever   cough   shortness of breath  What are severe complications from this virus? Some patients have pneumonia in both lungs, multi-organ failure and in some cases death  How can I help protect myself? People can help protect themselves from respiratory illness with everyday preventive actions  Avoid close contact with people who are sick  Avoid touching your eyes, nose, and mouth withunwashed hands  Wash your hands often with soap and water for at least 20 seconds  Use an alcohol-based hand  that contains at least 60% alcohol if soap and water are not available  If you are sick, to keep from spreading respiratory illness to others, you should   Stay home when you are sick      Cover your cough or sneeze with a tissue, then throw the tissue in the trash  Clean and disinfect frequently touched objectsand surfaces  What should I do if I recently traveled from an area with ongoing spread of COVID-19? If you have traveled from an affected area, there may be restrictions on your movements for up to 2 weeks  If you develop symptoms during that period (fever, cough, trouble breathing), seek medical advice  Call the office of your health care provider before you go, and tell them about your travel and your symptoms  They will give you instructions on how to get care without exposing other people to your illness  While sick, avoid contact with people, don't go out and delay any travel to reduce the possibility of spreading illness to others  Is there a treatment? There is no specific antiviral treatment for COVID-19  People with COVID-19 can seek medical care to helprelieve symptoms  For more information: www cdc gov/WOKFG45VH 061605-L 03/03/2020       What to do if you are sick withcoronavirus disease 2019 (COVID-19)     If you are sick with COVID-19 or suspect you are infected with the virus that causes COVID-19, follow the steps below to help prevent the disease from spreading to people in your home and community  Stay home except to get medical care   You should restrict activities outside your home, except for getting medical care  Do not go to work, school, or public areas  Avoid using public transportation, ride-sharing, or taxis  Separate yourself from other people and animals inyour home  People: As much as possible, you should stay in a specific room and away from other people in your home  Also, you should use a separate bathroom, if available  Animals: Do not handle pets or other animals while sick  See COVID-19 and Animals for more information    Call ahead before visiting your doctor   If you have a medical appointment, call the healthcare provider and tell them that you have or may have COVID-19  This will help the healthcare provider's office take steps to keep other people from getting infected or exposed  Wear a facemask  You should wear a facemask when you are around other people (e g , sharing a room or vehicle) or pets and before you enter a healthcare provider's office  If you are not able to wear a facemask (for example, because it causes trouble breathing), then people who live with you should not stay in the same room with you, or they should wear a facemask if they enteryour room  Cover your coughs and sneezes   Cover your mouth and nose with a tissue when you cough or sneeze  Throw used tissues in a lined trash can; immediately wash your hands with soap and water for at least 20 seconds or clean your hands with an alcohol-based hand  that contains at least 60 to 95% alcohol, covering all surfaces of your hands and rubbing them together until they feel dry  Soap and water should be used preferentially if hands are visibly dirty  Avoid sharing personal household items   You should not share dishes, drinking glasses, cups, eating utensils, towels, or bedding with other people or pets in your home  After using these items, they should be washed thoroughly with soap and water  Clean your hands often  Wash your hands often with soap and water for at least 20 seconds  If soap and water are not available, clean your hands with an alcohol-based hand  that contains at least 60% alcohol, covering all surfaces of your hands and rubbing them together until they feel dry  Soap and water should be used preferentially if hands are visibly dirty  Avoid touching your eyes, nose, and mouth with unwashed hands  Clean all "high-touch" surfaces every day  High touch surfaces include counters, tabletops, doorknobs, bathroom fixtures, toilets, phones, keyboards, tablets, and bedside tables  Also, clean any surfaces that may have blood, stool, or body fluids on them   Use a household cleaning spray or wipe, according to the label instructions  Labels contain instructions for safe and effective use of the cleaning product including precautions you should take when applying the product, such as wearing gloves and making sure you have good ventilation during use of the product  Monitor your symptoms  Seek prompt medical attention if your illness is worsening (e g , difficulty breathing)  Before seeking care, call your healthcare provider and tell them that you have, or are being evaluated for, COVID-19  Put on a facemask before you enter the facility  These steps will help the healthcare provider's office to keep other people in the office or waiting room from getting infectedor exposed  Ask your healthcare provider to call the local or state health department  Persons who are placed under active monitoring or facilitated self-monitoring should follow instructions provided by their local health department or occupational health professionals, as appropriate  If you have a medical emergency and need to call 911, notify the dispatch personnel that you have, or are being evaluated for COVID-19  If possible, put on a facemask before emergency medical services arrive  Discontinuing home isolation  Patients with confirmed COVID-19 should remain under home isolation precautions until the risk of secondary transmission to others is thought to be low  The decision to discontinue home isolation precautions should be made on a case-by-case basis, in consultation with healthcare providers and state and local health departments  For more information: www cdc gov/FDYHU58TT 498747-J 02/24/2020       Stay home when you are sick,except to get medical care  Wash your hands often with soap and water for at least 20 seconds  Cover your cough or sneeze with a tissue, then throw the tissue in the trash  Clean and disinfect frequently touched objects and surfaces  Avoid touching your eyes, nose, and mouth  STOP THE SPREAD OF GERMS  For more information: www cdc gov/COVID19 Avoid close contact with people who are sick  Help prevent the spread of respiratory diseases like COVID-19  [facility] [place]   COVID-19 CRISIS COUNSELING PROGRAM   GET CONNECTED WITH A FREE CRISIS COUNSELOR   CALL [phone]   Do you feel    Stressed? Overwhelmed? Alone? Afraid? Anxiety? During these uncertain times, you are not alone  We are here to listen  Please call our [facility] and Referral Line   [phone] TTY: [phone]             There are trained professionals available 24/7 ready to help you navigate these unprecedented challenges  These services are FREE & CONFIDENTIAL  This publication was made possible by [employer] Number [id number], in collaboration with the [address] [place] [address]

## 2021-04-12 NOTE — ASSESSMENT & PLAN NOTE
· CMP 04/12/2021:  , , alk phos 79  · Repeat AST//451 on 4/12/21, 216/391 on 4/13/21, trending down  · Acetaminophen level less than 10  · Salicylate level less than 1  · EKG NSR at 83 bpm with   · CXR negative  · Likely due to rhabdomyolysis, rule out hepatitis versus hepatic steatosis   · CK 1,134 with CK-MB 10 6 on 4/12/21,  on 4/13/21,  wnl on 4/14/21  · Given 1L NS bolus and placed on 125 ml/hr NS infusion which has now been discontinued with normal CK  · Acetylcysteine started for unexplained LFT elevation and subsequently discontinued  · Ultrasound- revealed slightly enlarged liver and large gallstone with no evidence of cirrhosis   · Hepatitis panel positive for Hep C antibody  Further workup per GI    · Continue to monitor LFTs

## 2021-04-12 NOTE — ASSESSMENT & PLAN NOTE
· Patient presented to [facility] via EMS  · Patient experienced a drug overdose at home, reportedly of a full syringe of heroin along with 0 5 oz K2 (smoked)  · received 10 mg total of Narcan during EMS transport to hospital  · Patient with chronic history of heroin abuse- reports starting heroin use in December 2020 via IV injection   · Reports daily use of heroin (1 mL/day)  · Increased use recently as treatment for pain   · Follow MAT protocol for opioid withdrawal  · IVDU history- Order HIV testing  · Hepatitis panel pending
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
## 2021-04-12 NOTE — PLAN OF CARE
Problem: Potential for Falls  Goal: Patient will remain free of falls  Description: INTERVENTIONS:  - Assess patient frequently for physical needs  -  Identify cognitive and physical deficits and behaviors that affect risk of falls    -  [place] fall precautions as indicated by assessment   - Educate patient/family on patient safety including physical limitations  - Instruct patient to call for assistance with activity based on assessment  - Modify environment to reduce risk of injury  - Consider OT/PT consult to assist with strengthening/mobility  Outcome: Progressing     Problem: Prexisting or High Potential for Compromised Skin Integrity  Goal: Skin integrity is maintained or improved  Description: INTERVENTIONS:  - Identify patients at risk for skin breakdown  - Assess and monitor skin integrity  - Assess and monitor nutrition and hydration status  - Monitor labs   - Assess for incontinence   - Turn and reposition patient  - Assist with mobility/ambulation  - Relieve pressure over bony prominences  - Avoid friction and shearing  - Provide appropriate hygiene as needed including keeping skin clean and dry  - Evaluate need for skin moisturizer/barrier cream  - Collaborate with interdisciplinary team   - Patient/family teaching  - Consider wound care consult   Outcome: Progressing     Problem: PAIN - ADULT  Goal: Verbalizes/displays adequate comfort level or baseline comfort level  Description: Interventions:  - Encourage patient to monitor pain and request assistance  - Assess pain using appropriate pain scale  - Administer analgesics based on type and severity of pain and evaluate response  - Implement non-pharmacological measures as appropriate and evaluate response  - Consider cultural and social influences on pain and pain management  - Notify physician/advanced practitioner if interventions unsuccessful or patient reports new pain  Outcome: Progressing     Problem: INFECTION - ADULT  Goal: Absence or prevention of progression during hospitalization  Description: INTERVENTIONS:  - Assess and monitor for signs and symptoms of infection  - Monitor lab/diagnostic results  - Monitor all insertion sites, i e  indwelling lines, tubes, and drains  - Monitor endotracheal if appropriate and nasal secretions for changes in amount and color  - [place] appropriate cooling/warming therapies per order  - Administer medications as ordered  - Instruct and encourage patient and family to use good hand hygiene technique  - Identify and instruct in appropriate isolation precautions for identified infection/condition  Outcome: Progressing  Goal: Absence of fever/infection during neutropenic period  Description: INTERVENTIONS:  - Monitor WBC    Outcome: Progressing     Problem: SAFETY ADULT  Goal: Patient will remain free of falls  Description: INTERVENTIONS:  - Assess patient frequently for physical needs  -  Identify cognitive and physical deficits and behaviors that affect risk of falls    -  [place] fall precautions as indicated by assessment   - Educate patient/family on patient safety including physical limitations  - Instruct patient to call for assistance with activity based on assessment  - Modify environment to reduce risk of injury  - Consider OT/PT consult to assist with strengthening/mobility  Outcome: Progressing  Goal: Maintain or return to baseline ADL function  Description: INTERVENTIONS:  -  Assess patient's ability to carry out ADLs; assess patient's baseline for ADL function and identify physical deficits which impact ability to perform ADLs (bathing, care of mouth/teeth, toileting, grooming, dressing, etc )  - Assess/evaluate cause of self-care deficits   - Assess range of motion  - Assess patient's mobility; develop plan if impaired  - Assess patient's need for assistive devices and provide as appropriate  - Encourage maximum independence but intervene and supervise when necessary  - Involve family in performance of ADLs  - Assess for home care needs following discharge   - Consider OT consult to assist with ADL evaluation and planning for discharge  - Provide patient education as appropriate  Outcome: Progressing  Goal: Maintain or return mobility status to optimal level  Description: INTERVENTIONS:  - Assess patient's baseline mobility status (ambulation, transfers, stairs, etc )    - Identify cognitive and physical deficits and behaviors that affect mobility  - Identify mobility aids required to assist with transfers and/or ambulation (gait belt, sit-to-stand, lift, walker, cane, etc )  - [place] fall precautions as indicated by assessment  - Record patient progress and toleration of activity level on Mobility SBAR; progress patient to next Phase/Stage  - Instruct patient to call for assistance with activity based on assessment  - Consider rehabilitation consult to assist with strengthening/weightbearing, etc   Outcome: Progressing     Problem: DISCHARGE PLANNING  Goal: Discharge to home or other facility with appropriate resources  Description: INTERVENTIONS:  - Identify barriers to discharge w/patient and caregiver  - Arrange for needed discharge resources and transportation as appropriate  - Identify discharge learning needs (meds, wound care, etc )  - Arrange for interpretive services to assist at discharge as needed  - Refer to Case Management Department for coordinating discharge planning if the patient needs post-hospital services based on physician/advanced practitioner order or complex needs related to functional status, cognitive ability, or social support system  Outcome: Progressing     Problem: Knowledge Deficit  Goal: Patient/family/caregiver demonstrates understanding of disease process, treatment plan, medications, and discharge instructions  Description: Complete learning assessment and assess knowledge base    Interventions:  - Provide teaching at level of understanding  - Provide teaching via preferred learning methods  Outcome: Progressing

## 2021-04-12 NOTE — MALNUTRITION/BMI
This medical record reflects one or more clinical indicators suggestive of malnutrition and/or morbid obesity  BMI Findings: Body mass index is 40 01 kg/m²  See Nutrition note dated 4/12/21 for additional details  Completed nutrition assessment is viewable in the nutrition documentation

## 2021-04-12 NOTE — UTILIZATION REVIEW
Initial Clinical Review    Admission: Date/Time/Statement:   Admission Orders (From admission, onward)     Ordered        04/12/21 0447  Inpatient Admission  Once                   Orders Placed This Encounter   Procedures    Inpatient Admission     Standing Status:   Standing     Number of Occurrences:   1     Order Specific Question:   Level of Care     Answer:   Level 2 Stepdown / HOT [14]     Order Specific Question:   Bed request comments     Answer:   detox     Order Specific Question:   Estimated length of stay     Answer:   More than 2 Midnights     Order Specific Question:   Certification     Answer:   I certify that inpatient services are medically necessary for this patient for a duration of greater than two midnights  See H&P and MD Progress Notes for additional information about the patient's course of treatment  ED Arrival Information     Expected Arrival Acuity Means of Arrival Escorted By Service Admission Type    - 4/12/2021 03:29 [facility]gent [facility] ([address] [facility]) Medical Toxicology Emergency    Arrival Complaint    Overdose        Chief Complaint   Patient presents with    Overdose - Accidental     Patient found unresponsive on the floor per EMS  Given Narcan by [employer] & EMS  Assessment/Plan: 32 yo m with a pmh of opioid use ( IV heroin, polysubstance abuse, meth, K2,Xanax ) he presented to the Ed, after a drug overdose, friends actually started CPR out of concern for cardiac arrest  He was given 10 mg of Narcan with improvement of symptoms, he had a brief episodes of hypoxia, requiring supplemental O2  It is noted to have elevation in his liver function tests  Acetaminophen levels tested, Pt requested detoxification and rehab  He is admitted inpatient to the Detox unit at [facility], to a level IV medically managed inpatient  Detox unit  4/12 - Pt with complaint of nausea and headache, using Zofran and ibuprofen    Xanax 0 5 mg bid started CPK to 1134 mild Rhabdomyolysis  Started on IVF's after 1 L Bolus  4/13 -  Overnight pt's Sats dropped to low of 79 while asleep, quickly recovers    HOB elevated to 45%, and he was placed on 2 L NC O2       ED Triage Vitals   Temperature Pulse Respirations Blood Pressure SpO2   04/12/21 0338 04/12/21 0338 04/12/21 0338 04/12/21 0338 04/12/21 0338   (!) 96 3 °F (35 7 °C) 80 16 141/82 100 %      Temp Source Heart Rate Source Patient Position - Orthostatic VS BP Location FiO2 (%)   04/12/21 0338 04/12/21 0338 04/12/21 0338 04/12/21 0338 --   Tympanic Monitor Lying Left arm       Pain Score       04/12/21 0522       Worst Possible Pain          Wt Readings from Last 1 Encounters:   04/12/21 134 kg (295 lb)     Additional Vital Signs:   Date/Time  Temp  Pulse  Resp  BP  MAP (mmHg)  SpO2  Calculated FIO2 (%) - Nasal Cannula  Nasal Cannula O2 Flow Rate (L/min)  O2 Device  Patient Position - Orthostatic VS   04/12/21 1111  97 5 °F (36 4 °C)  77  20  107/46Abnormal   66  97 %  --  --  None (Room air)  Lying   04/12/21 0930  --  89  --  --  --  --  --  --  --  Lying   04/12/21 0715  98 °F (36 7 °C)  104  20  131/61  --  97 %  --  --  None (Room air)  Lying   04/12/21 0540  --  --  --  --  --  99 %  --  --  None (Room air)  --   04/12/21 0522  98 4 °F (36 9 °C)  108Abnormal   18  129/75  --  --  --  --  --  Sitting   04/12/21 0430  --  91  18  141/80  --  98 %  --  --  None (Room air)  Lying   04/12/21 0400  --  93  16  141/80  100  98 %  28  2 L/min  Nasal cannula  --   04/12/21 0344  --  --  --  --  --  90 %  28  2 L/min  Nasal cannula       Clinical Opiate Withdrawal Scale    Row Name  04/13/21   0719  04/13/21   0404  04/13/21   0358  04/13/21   0211  04/13/21   0010   Clinical Opiate Withdrawal Scale   Pulse  59  57  61  64  58   Heart Rate Source  Monitor  --  Monitor  Monitor  --   Patient Position - Orthostatic VS  Lying  Lying  Lying  Lying  Lying   Resting Pulse Rate: Measured After Patient is Sitting or Lying for One Minute  -- --  0  0  --   GI Upset: Over Last Half Hour  --  --  0  0  --   Sweating: Over Past Half Hour Not Accounted for by Room Temperature of Patient Activity  --  --  0  0  --   Tremor: Observation of Outstretched Hands  --  --  0  0  --   Restlessness: Observation During Assessment  --  --  0  0  --   Yawning: Observation During Assessment  --  --  0  0  --   Pupil Size  --  --  0  0  --   Anxiety and Irritability  --  --  0  0  --   Bone or Joint Aches: If Patient was Having Pain Previously, Only the Additional Component Attributed to Opiate Withdrawal is Scored  --  --  0  0  --   Gooseflesh Skin  --  --  0  0  --   Runny Nose or Tearing: Not Accounted for by Cold Symptoms or Allergies  --  --  0  0  --   Clinical Opiate Withdrawal Scale Total Score  --  --  0  0  --   Row Name  04/12/21   2254  04/12/21   2223  04/12/21   1936  04/12/21   1858  04/12/21   1730   Clinical Opiate Withdrawal Scale   Pulse  57  57  79  85  83   Heart Rate Source  Monitor  Apical  Monitor  --  Monitor   Patient Position - Orthostatic VS  Lying  Lying  Lying  Lying  Lying   Resting Pulse Rate: Measured After Patient is Sitting or Lying for One Minute  0  --  0  --  1   GI Upset: Over Last Half Hour  0  --  0  --  0   Sweating: Over Past Half Hour Not Accounted for by Room Temperature of Patient Activity  0  --  0  --  0   Tremor: Observation of Outstretched Hands  0  --  0  --  0   Restlessness: Observation During Assessment  0  --  0  --  1   Yawning: Observation During Assessment  0  --  1  --  1   Pupil Size  0  --  0  --  0   Anxiety and Irritability  0  --  0  --  0   Bone or Joint Aches: If Patient was Having Pain Previously, Only the Additional Component Attributed to Opiate Withdrawal is Scored  0  --  1  --  0   Gooseflesh Skin  0  --  0  --  0   Runny Nose or Tearing: Not Accounted for by Cold Symptoms or Allergies  0  --  0  --  0   Clinical Opiate Withdrawal Scale Total Score  0  --  2  --  3   Row Name  04/12/21   3183 04/12/21   1330  04/12/21   1111  04/12/21   0930  04/12/21   0900   Clinical Opiate Withdrawal Scale   Pulse  90  89  77  89  --   Heart Rate Source  Monitor  Monitor  Monitor  Monitor  --   Patient Position - Orthostatic VS  Lying  Lying  Lying  Lying  --   Resting Pulse Rate: Measured After Patient is Sitting or Lying for One Minute  --  1  --  1  --   GI Upset: Over Last Half Hour  --  0  --  0  --   Sweating: Over Past Half Hour Not Accounted for by Room Temperature of Patient Activity  --  0  --  0  --   Tremor: Observation of Outstretched Hands  --  0  --  0  --   Restlessness: Observation During Assessment  --  1  --  3  --   Yawning: Observation During Assessment  --  1  --  0  --   Pupil Size  --  0  --  0  --   Anxiety and Irritability  --  0  --  0  --   Bone or Joint Aches: If Patient was Having Pain Previously, Only the Additional Component Attributed to Opiate Withdrawal is Scored  --  0  --  0  --   Gooseflesh Skin  --  0  --  0  --   Runny Nose or Tearing: Not Accounted for by Cold Symptoms or Allergies  --  0  --  0  --   Clinical Opiate Withdrawal Scale Total Score  --  3  --  4  --   Row Name  04/12/21   0715  04/12/21   0522  04/12/21   0430  04/12/21   0400  04/12/21   0338   Clinical Opiate Withdrawal Scale   Pulse  104  108Abnormal   91  93  80   Heart Rate Source  Monitor  Monitor  Monitor  --  Monitor   Patient Position - Orthostatic VS  Lying  Sitting  Lying  --  Lying   Resting Pulse Rate: Measured After Patient is Sitting or Lying for One Minute  --  2  --  --  --   GI Upset: Over Last Half Hour  --  0  --  --  --   Sweating: Over Past Half Hour Not Accounted for by Room Temperature of Patient Activity  --  0  --  --  --   Tremor: Observation of Outstretched Hands  --  0  --  --  --   Restlessness: Observation During Assessment  --  0  --  --  --   Yawning: Observation During Assessment  --  0  --  --  --   Pupil Size  --  0  --  --  --   Anxiety and Irritability  --  0  --  --  -- Bone or Joint Aches: If Patient was Having Pain Previously, Only the Additional Component Attributed to Opiate Withdrawal is Scored  --  1  --  --  --   Gooseflesh Skin  --  0  --  --  --   Runny Nose or Tearing: Not Accounted for by Cold Symptoms or Allergies  --  0  --  --  --   Clinical Opiate Withdrawal Scale Total Score  --  3  --  --           Pertinent Labs/Diagnostic Test Results:       Results from last 7 days   Lab Units 04/12/21  1045 04/12/21  0347   WBC Thousand/uL 8 40 8 70   HEMOGLOBIN g/dL 12 7* 13 9   HEMATOCRIT % 38 0* 40 6*   PLATELETS Thousands/uL 193 211   NEUTROS ABS Thousands/µL 5 70 6 20         Results from last 7 days   Lab Units 04/12/21  0347   SODIUM mmol/L 135*   POTASSIUM mmol/L 4 1   CHLORIDE mmol/L 98   CO2 mmol/L 30   ANION GAP mmol/L 7   BUN mg/dL 13   CREATININE mg/dL 0 96   EGFR ml/min/1 73sq m 107   CALCIUM mg/dL 8 8     Results from last 7 days   Lab Units 04/12/21  0347   AST U/L 457*   ALT U/L 569*   ALK PHOS U/L 79   TOTAL PROTEIN g/dL 7 8   ALBUMIN g/dL 4 1   TOTAL BILIRUBIN mg/dL 2 77*     Results from last 7 days   Lab Units 04/12/21  0347   GLUCOSE RANDOM mg/dL 117*       Results from last 7 days   Lab Units 04/12/21  0347   TROPONIN I ng/mL 0 02     Results from last 7 days   Lab Units 04/12/21  0347   ACETAMINOPHEN LVL ug/mL <10*     CXR 4/12 -  No acute  ECG  Collection Time Result Time Vent R Atrial R MI Int  QRSD Int  QT Int  QTC Int   P Axis QRS Axis T Wave Ax    04/12/21 03:49:37 04/12/21 09:51:21 83 83 130 84 352 413 55 60 28       Normal sinus rhythm  Normal ECG No previous ECGs available       ED Treatment:   Medication Administration from 04/12/2021 0329 to 04/12/2021 8567       Date/Time Order Dose Route Action Comments     04/12/2021 0407 sodium chloride 0 9 % bolus 1,000 mL 1,000 mL Intravenous New Bag         Past Medical History:   Diagnosis Date    Allergic     Development delay     Obesity      Present on Admission:   Opioid use disorder ([address] )   Elevated LFTs   Substance use disorder   Head injury      Admitting Diagnosis: Substance abuse ([facility] [address] ) [F19 10]  Overdose [T50 901A]  Transaminitis [R74 01]  Overdose, accidental or unintentional, initial encounter [T50 901A]  Age/Sex: 31 y o  male       Admission Orders:  Scheduled Medications:  acetylcysteine, 100 mg/kg, Intravenous, Once - 4/12 x1  acetylcysteine, 150 mg/kg, Intravenous, Once - 4/12 x 1   acetylcysteine, 50 mg/kg, Intravenous, Once-4/12 x 1  ALPRAZolam, 0 5 mg, Oral,  bid   NSS 1 L Bolus - 4/12  X 1 ( 12:47 )     Continuous IV Infusions:    sodium chloride 0 9 % infusion   Rate: 125 mL/hr Dose: 125 mL/hr  Freq: Continuous Route: IV  Indications Comment: elevated CK  Last Dose: 125 mL/hr (04/13/21 0410)  Start: 04/12/21 1245      PRN Meds:  cloNIDine, 0 1 mg, Oral, Q6H PRN  gabapentin, 300 mg, Oral, Q8H PRN  ibuprofen, 600 mg, Oral, Q6H PRN - 4/12 x 3   ondansetron, 4 mg, Oral, Q6H PRN - 4/12 x 2    Nursing orders -  Telem - SCD's to le's - up & OOB  as tolerated -  [facility] diet        Network Utilization Review Department  ATTENTION: Please call with any questions or concerns to [phone] and carefully listen to the prompts so that you are directed to the right person  All voicemails are confidential   Jose Venegas all requests for admission clinical reviews, approved or denied determinations and any other requests to dedicated fax number below belonging to the campus where the patient is receiving treatment   List of dedicated fax numbers for the Facilities:  [address] [place] [address] DENIALS (Administrative/Medical Necessity) [phone]   [address]  (Maternity/NICU/Pediatrics) [address] [facility] [address] Courtney Marks 8447 (Kyra Nicole "Vidya" [address]) [phone]     [address] [facility] [address] [phone]   [place] [address] [phone]

## 2021-04-12 NOTE — ASSESSMENT & PLAN NOTE
· Patient presents for treatment of opioid use disorder  · Patient suffered acute overdose, 10 mg total of Narcan administered in EMS prior to arrival at the ED  · Current use of IV heroin (1 ml/day)  · Per patient- initiated heroin use in Dec  2020  · Follow MAT/COWS protocol

## 2021-04-12 NOTE — QUICK NOTE
Patient seen at bedside during rounds  Complains of mild nausea and headache for which as needed Zofran and Ibuprofen were ordered respectively  Reports taking Xanax 0 5 mg BID off the streets which was restarted to prevent BZD withdrawal  Reports he would like treatment with Suboxone and will be induced tomorrow with further improvement  LFTs were elevated at AST//569 which improved on recheck to 263/451  Salicylate level and acetaminophen levels are within normal limits  INR within normal limits  HIV panel negative  CXR negative  EKG NSR at 83 bpm with   Hepatitis panel pending  Ultrasound ordered and pending  CK elevated at 1,134 with CK-MB 10 6 indicating mild Rhabdomyolysis  Will give 1L NS and start 125 ml/hr NS infusion  Most recent COWS score was 4  Patient is on parole and assigned  will be in to see patient  Officer states that patient will not be in violation of parole if he goes to inpatient rehab by Friday  This will be discussed with patient

## 2021-04-12 NOTE — ASSESSMENT & PLAN NOTE
· Patient presents for treatment of opioid use disorder  · Patient suffered acute overdose, 10 mg total of Narcan administered in EMS prior to arrival at the ED  · Current use of IV heroin (1 ml/day)  · Per patient- increased heroin use starting Dec  2020  Has been using off/on since 26years old    · Follow MAT/COWS protocol

## 2021-04-12 NOTE — ASSESSMENT & PLAN NOTE
· Patient reports history of polysubstance abuse including meth, K2, xanax   · Started using Meth and K2 at age 17, heroine at 26, and Xanax at 13years old  · Several drug free periods throughout life, including 13-month span in 3109-5189 when patient was in skilled nursing  · Last use of meth reportedly Saturday evening- 3 g  · Last use of K2 overnight- 0 5 oz  · Stated Meth is drug of choice with opioid use increasing for the past month  · Reports daily use of 0 5 mg Xanax off the street  · Daily vaping with approximately 900 puffs per day  · History of multiple rehab treatments (5 last year alone), most recent in 2020 per patient  · Patient expresses interest in stopping drug use  · Will refer for placement in rehab upon stabilization from detox standpoint  · Start Suboxone 8 mg SL QD now that patient is out of withdrawal phase window

## 2021-04-12 NOTE — CASE MANAGEMENT
Rec'd call from pt's  Hajajoseph Hart, work cell# [phone]  George Barefoot reports pt is on probation with [facility] till Nov 17, 2021 for theft  George Barefoot reports pt is currently in court mandated treatment  George Barefoot reports need for letter confirming pt's admission to unit  CM questioned if pt has violated probation with admission  George Barefoot reports pt must be in an inpatient rehab program by Friday  George Luna requesting letter emailed to her at [email or web address]  George Marley requested to speak with pt; pt currently refusing due to having a headache  CM provided pt with contact info for Anastasiya Granados informed pt's RN of same

## 2021-04-12 NOTE — PLAN OF CARE
Problem: Potential for Falls  Goal: Patient will remain free of falls  Description: INTERVENTIONS:  - Assess patient frequently for physical needs  -  Identify cognitive and physical deficits and behaviors that affect risk of falls    -  [place] fall precautions as indicated by assessment   - Educate patient/family on patient safety including physical limitations  - Instruct patient to call for assistance with activity based on assessment  - Modify environment to reduce risk of injury  - Consider OT/PT consult to assist with strengthening/mobility  Outcome: Progressing     Problem: Prexisting or High Potential for Compromised Skin Integrity  Goal: Skin integrity is maintained or improved  Description: INTERVENTIONS:  - Identify patients at risk for skin breakdown  - Assess and monitor skin integrity  - Assess and monitor nutrition and hydration status  - Monitor labs   - Assess for incontinence   - Turn and reposition patient  - Assist with mobility/ambulation  - Relieve pressure over bony prominences  - Avoid friction and shearing  - Provide appropriate hygiene as needed including keeping skin clean and dry  - Evaluate need for skin moisturizer/barrier cream  - Collaborate with interdisciplinary team   - Patient/family teaching  - Consider wound care consult   Outcome: Progressing     Problem: PAIN - ADULT  Goal: Verbalizes/displays adequate comfort level or baseline comfort level  Description: Interventions:  - Encourage patient to monitor pain and request assistance  - Assess pain using appropriate pain scale  - Administer analgesics based on type and severity of pain and evaluate response  - Implement non-pharmacological measures as appropriate and evaluate response  - Consider cultural and social influences on pain and pain management  - Notify physician/advanced practitioner if interventions unsuccessful or patient reports new pain  Outcome: Progressing     Problem: INFECTION - ADULT  Goal: Absence or prevention of progression during hospitalization  Description: INTERVENTIONS:  - Assess and monitor for signs and symptoms of infection  - Monitor lab/diagnostic results  - Monitor all insertion sites, i e  indwelling lines, tubes, and drains  - Monitor endotracheal if appropriate and nasal secretions for changes in amount and color  - [place] appropriate cooling/warming therapies per order  - Administer medications as ordered  - Instruct and encourage patient and family to use good hand hygiene technique  - Identify and instruct in appropriate isolation precautions for identified infection/condition  Outcome: Progressing  Goal: Absence of fever/infection during neutropenic period  Description: INTERVENTIONS:  - Monitor WBC    Outcome: Progressing     Problem: SAFETY ADULT  Goal: Patient will remain free of falls  Description: INTERVENTIONS:  - Assess patient frequently for physical needs  -  Identify cognitive and physical deficits and behaviors that affect risk of falls    -  [place] fall precautions as indicated by assessment   - Educate patient/family on patient safety including physical limitations  - Instruct patient to call for assistance with activity based on assessment  - Modify environment to reduce risk of injury  - Consider OT/PT consult to assist with strengthening/mobility  Outcome: Progressing  Goal: Maintain or return to baseline ADL function  Description: INTERVENTIONS:  -  Assess patient's ability to carry out ADLs; assess patient's baseline for ADL function and identify physical deficits which impact ability to perform ADLs (bathing, care of mouth/teeth, toileting, grooming, dressing, etc )  - Assess/evaluate cause of self-care deficits   - Assess range of motion  - Assess patient's mobility; develop plan if impaired  - Assess patient's need for assistive devices and provide as appropriate  - Encourage maximum independence but intervene and supervise when necessary  - Involve family in performance of ADLs  - Assess for home care needs following discharge   - Consider OT consult to assist with ADL evaluation and planning for discharge  - Provide patient education as appropriate  Outcome: Progressing  Goal: Maintain or return mobility status to optimal level  Description: INTERVENTIONS:  - Assess patient's baseline mobility status (ambulation, transfers, stairs, etc )    - Identify cognitive and physical deficits and behaviors that affect mobility  - Identify mobility aids required to assist with transfers and/or ambulation (gait belt, sit-to-stand, lift, walker, cane, etc )  - [place] fall precautions as indicated by assessment  - Record patient progress and toleration of activity level on Mobility SBAR; progress patient to next Phase/Stage  - Instruct patient to call for assistance with activity based on assessment  - Consider rehabilitation consult to assist with strengthening/weightbearing, etc   Outcome: Progressing     Problem: DISCHARGE PLANNING  Goal: Discharge to home or other facility with appropriate resources  Description: INTERVENTIONS:  - Identify barriers to discharge w/patient and caregiver  - Arrange for needed discharge resources and transportation as appropriate  - Identify discharge learning needs (meds, wound care, etc )  - Arrange for interpretive services to assist at discharge as needed  - Refer to Case Management Department for coordinating discharge planning if the patient needs post-hospital services based on physician/advanced practitioner order or complex needs related to functional status, cognitive ability, or social support system  Outcome: Progressing     Problem: Knowledge Deficit  Goal: Patient/family/caregiver demonstrates understanding of disease process, treatment plan, medications, and discharge instructions  Description: Complete learning assessment and assess knowledge base    Interventions:  - Provide teaching at level of understanding  - Provide teaching via preferred learning methods  Outcome: Progressing     Problem: Nutrition/Hydration-ADULT  Goal: Nutrient/Hydration intake appropriate for improving, restoring or maintaining nutritional needs  Description: Monitor and assess patient's nutrition/hydration status for malnutrition  Collaborate with interdisciplinary team and initiate plan and interventions as ordered  Monitor patient's weight and dietary intake as ordered or per policy  Utilize nutrition screening tool and intervene as necessary  Determine patient's food preferences and provide high-protein, high-caloric foods as appropriate       INTERVENTIONS:  - Monitor oral intake, urinary output, labs, and treatment plans  - Assess nutrition and hydration status and recommend course of action  - Evaluate amount of meals eaten  - Assist patient with eating if necessary   - Allow adequate time for meals  - Recommend/ encourage appropriate diets, oral nutritional supplements, and vitamin/mineral supplements  - Order, calculate, and assess calorie counts as needed  - Recommend, monitor, and adjust tube feedings and TPN/PPN based on assessed needs  - Assess need for intravenous fluids  - Provide specific nutrition/hydration education as appropriate  - Include patient/family/caregiver in decisions related to nutrition  Outcome: Progressing

## 2021-04-12 NOTE — ED NOTES
Attempted to get patient to complete breath alcohol test, pt unable to complete after multiple attempts      Cade Pinto RN  04/12/21 2526

## 2021-04-12 NOTE — ASSESSMENT & PLAN NOTE
· Patient reports history of polysubstance abuse including meth, K2, xanax   · Started using at age 17   · Several drug free periods throughout life, including 13-month span in 3551-7881 when patient was in longterm  · Last use of meth reportedly Saturday evening- 3 g  · Last use of K2 overnight- 0 5 oz  · History of multiple rehab treatments, most recent in 2020 per patient  · Patient expresses interest in stopping drug use  · Consider placement in rehab upon stabilization from detox standpoint
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
## 2021-04-12 NOTE — ED PROVIDER NOTES
History  Chief Complaint   Patient presents with    Overdose - Accidental     Patient found unresponsive on the floor per EMS  Given Narcan by [employer] & EMS  History provided by:  Patient  Overdose - Accidental  Ingested substance:  Illicit drugs  Illicit drug type: Other  Incident location:  Unable to specify  Context: recreational    Associated symptoms: no abdominal pain, no chest pain, no cough, no diarrhea, no headaches, no nausea and no shortness of breath        Prior to Admission Medications   Prescriptions Last Dose Informant Patient Reported? Taking? Melatonin ER 5 MG TBCR   No No   Sig: Take 5 mg by mouth daily at bedtime as needed (prn) May repeat dose in one hour if needed  Patient not taking: Reported on 5/18/2020   QUEtiapine (SEROquel) 400 MG tablet   Yes No   Sig: Take 400 mg by mouth daily at bedtime   busPIRone (BUSPAR) 30 MG tablet   Yes No   Sig: Take 30 mg by mouth 3 (three) times a day   ibuprofen (ADVIL,MOTRIN) 100 MG tablet   No No   Sig: Take 1 tablet (100 mg total) by mouth every 8 (eight) hours as needed for moderate pain   Patient not taking: Reported on 5/18/2020   loratadine (CLARITIN) 10 mg tablet   No No   Sig: Take 1 tablet (10 mg total) by mouth daily   Patient not taking: Reported on 5/18/2020   loratadine (CLARITIN) 10 mg tablet   No No   Sig: TAKE 1 TABLET BY MOUTH DAILY AT 8A * (ALLERGIES)   Patient not taking: Reported on 5/18/2020      Facility-Administered Medications: None       Past Medical History:   Diagnosis Date    Allergic     Development delay     Obesity        Past Surgical History:   Procedure Laterality Date    EAR SURGERY         History reviewed  No pertinent family history  I have reviewed and agree with the history as documented      E-Cigarette/Vaping    E-Cigarette Use Current Every Day User      E-Cigarette/Vaping Substances     Social History     Tobacco Use    Smoking status: Former Smoker     Packs/day: 0 00    Smokeless tobacco: Never Used   Substance Use Topics    Alcohol use: No    Drug use: Yes     Types: MDMA (ecstacy), Methamphetamines       Review of Systems   Constitutional: Negative for chills and fever  HENT: Negative for rhinorrhea, sore throat and trouble swallowing  Eyes: Negative for pain  Respiratory: Negative for cough, shortness of breath, wheezing and stridor  Cardiovascular: Negative for chest pain and leg swelling  Gastrointestinal: Negative for abdominal pain, diarrhea and nausea  Endocrine: Negative for polyuria  Genitourinary: Negative for dysuria, flank pain and urgency  Musculoskeletal: Negative for joint swelling, myalgias and neck stiffness  Skin: Negative for rash  Allergic/Immunologic: Negative for immunocompromised state  Neurological: Negative for dizziness, syncope, weakness, numbness and headaches  Psychiatric/Behavioral: Negative for confusion and suicidal ideas  All other systems reviewed and are negative  Physical Exam  Physical Exam  Vitals signs and nursing note reviewed  Constitutional:       Appearance: He is well-developed  HENT:      Head: Normocephalic and atraumatic  Eyes:      Pupils: Pupils are equal, round, and reactive to light  Neck:      Musculoskeletal: Normal range of motion and neck supple  Cardiovascular:      Rate and Rhythm: Normal rate and regular rhythm  Heart sounds: Normal heart sounds  No murmur  No friction rub  Pulmonary:      Effort: Pulmonary effort is normal  No respiratory distress  Breath sounds: No wheezing or rales  Abdominal:      General: Bowel sounds are normal       Palpations: Abdomen is soft  There is no mass  Tenderness: There is no abdominal tenderness  There is no guarding  Skin:     General: Skin is warm  Findings: No rash  Neurological:      Mental Status: He is alert and oriented to person, place, and time           Vital Signs  ED Triage Vitals [04/12/21 0338]   Temperature Pulse Respirations Blood Pressure SpO2   (!) 96 3 °F (35 7 °C) 80 16 141/82 100 %      Temp Source Heart Rate Source Patient Position - Orthostatic VS BP Location FiO2 (%)   Tympanic Monitor Lying Left arm --      Pain Score       --           Vitals:    04/12/21 0338 04/12/21 0400   BP: 141/82 141/80   Pulse: 80 93   Patient Position - Orthostatic VS: Lying          Visual Acuity      ED Medications  Medications   sodium chloride 0 9 % bolus 1,000 mL (1,000 mL Intravenous New Bag 4/12/21 0407)   naloxone (FOR EMS ONLY) (NARCAN) 2 MG/2ML injection 6 mg (0 mg Does not apply Given to EMS 4/12/21 0346)       Diagnostic Studies  Results Reviewed     Procedure Component Value Units Date/Time    POCT alcohol breath test [[id number]]     Lab Status: No result     Hepatitis panel, acute [[id number]] Collected: 04/12/21 0430    Lab Status: No result Specimen: Blood from Arm, Left     Acetaminophen level-If concentration is detectable, please discuss with medical  on call   [[id number]]     Lab Status: No result Specimen: Blood     Troponin I [[id number]]  (Normal) Collected: 04/12/21 0347    Lab Status: Final result Specimen: Blood from Arm, Left Updated: 04/12/21 0422     Troponin I 0 02 ng/mL     Narrative:      Hemolysis    Comprehensive metabolic panel [[id number]]  (Abnormal) Collected: 04/12/21 0347    Lab Status: Final result Specimen: Blood from Arm, Left Updated: 04/12/21 0412     Sodium 135 mmol/L      Potassium 4 1 mmol/L      Chloride 98 mmol/L      CO2 30 mmol/L      ANION GAP 7 mmol/L      BUN 13 mg/dL      Creatinine 0 96 mg/dL      Glucose 117 mg/dL      Calcium 8 8 mg/dL       U/L       U/L      Alkaline Phosphatase 79 U/L      Total Protein 7 8 g/dL      Albumin 4 1 g/dL      Total Bilirubin 2 77 mg/dL      eGFR 107 ml/min/1 73sq m     Narrative:      Hemolysis  National Kidney Disease Foundation guidelines for Chronic Kidney Disease (CKD):     Stage 1 with normal or high GFR (GFR > 90 mL/min/1 73 square meters)    Stage 2 Mild CKD (GFR = 60-89 mL/min/1 73 square meters)    Stage 3A Moderate CKD (GFR = 45-59 mL/min/1 73 square meters)    Stage 3B Moderate CKD (GFR = 30-44 mL/min/1 73 square meters)    Stage 4 Severe CKD (GFR = 15-29 mL/min/1 73 square meters)    Stage 5 End Stage CKD (GFR <15 mL/min/1 73 square meters)  Note: GFR calculation is accurate only with a steady state creatinine    CBC and differential [[id number]]  (Abnormal) Collected: 04/12/21 0347    Lab Status: Final result Specimen: Blood from Arm, Left Updated: 04/12/21 0400     WBC 8 70 Thousand/uL      RBC 4 47 Million/uL      Hemoglobin 13 9 g/dL      Hematocrit 40 6 %      MCV 91 fL      MCH 31 1 pg      MCHC 34 2 g/dL      RDW 14 6 %      MPV 8 6 fL      Platelets 485 Thousands/uL      Neutrophils Relative 71 %      Lymphocytes Relative 15 %      Monocytes Relative 12 %      Eosinophils Relative 2 %      Basophils Relative 0 %      Neutrophils Absolute 6 20 Thousands/µL      Lymphocytes Absolute 1 30 Thousands/µL      Monocytes Absolute 1 00 Thousand/µL      Eosinophils Absolute 0 20 Thousand/µL      Basophils Absolute 0 00 Thousands/µL                  XR chest 1 view portable    (Results Pending)              Procedures  Procedures         ED Course  ED Course as of Apr 12 0437   Mon Apr 12, 2021   5182 Patient with noted transaminitis well as on acetaminophen level as well as hepatitis acute panel  Patient had brief moment of hypoxia was placed on oxygen  Attempting to wean off oxygen at this point time  Suspect plan on admission to the detox unit if meeting criteria                                                MDM  Number of Diagnoses or Management Options  Overdose:   Substance abuse ([facility] [address] ):   Transaminitis:   Diagnosis management comments: 30M with hx of heroin and meth abuse, Patient was an acute drug overdose friends actually started CPR on the patient for concern for cardiac arrest   Patient was given 10 mg total of Narcan with improvement of symptoms and awake at this point time some mild withdrawal symptoms as well  Patient had a brief episode of hypoxia was placed on oxygen currently weaned off  Does have noted elevation in his liver function tests  Placed an order for acetaminophen testing as well as offered the patient detoxification and rehabilitation services the patient is agreeable for detoxification and wants to be treated and off of heroin  Amount and/or Complexity of Data Reviewed  Clinical lab tests: ordered and reviewed  Tests in the radiology section of CPT®: ordered and reviewed  Review and summarize past medical records: yes  Independent visualization of images, tracings, or specimens: yes        Disposition  Final diagnoses:   Substance abuse ([facility] )   Overdose   Transaminitis     Time reflects when diagnosis was documented in both MDM as applicable and the Disposition within this note     Time User Action Codes Description Comment    4/12/2021  4:35 AM [place] Bohr Add [F19 10] [facility]     4/12/2021  4:35 AM [place] Bohr Add [T50 901A] Overdose     4/12/2021  4:35 AM [place] Bohr Add [R74 01] Transaminitis       ED Disposition     None      Follow-up Information    None         Patient's Medications   Discharge Prescriptions    No medications on file     No discharge procedures on file      PDMP Review     None          ED Provider  Electronically Signed by           Keenan Greenfield DO  04/12/21 1967

## 2021-04-12 NOTE — ASSESSMENT & PLAN NOTE
· CMP 04/12/2021:  , , alk phos 79  · Acetaminophen level less than 10  · Possibly secondary to hepatitis versus hepatic steatosis   · Hepatitis panel pending  · Continue to monitor LFTs

## 2021-04-12 NOTE — ED NOTES
Per EMS they were not sure what patient overdosed on, but it is suspected heroin overdose especially since he woke up after the nasal and IV Narcan given by police and EMS  Police gave Narcan 4mg nasally and EMS inserted a #18 jelco and gave patient an additional 6mg IV push  Upon arrival to the department the patient was alert and oriented                                 Dorota Powers RN  04/12/21 4320
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
## 2021-04-12 NOTE — TELEPHONE ENCOUNTER
Phone call from Keri Arellano to let us know that Romina Mondragon was admitted to the Detox Unit at [facility] today  If you have any questions you can call Danny Gutierrez () at [phone]   ( pt was last here 12/10/18 with Noemy Avery)

## 2021-04-13 ENCOUNTER — APPOINTMENT (INPATIENT)
Dept: ULTRASOUND IMAGING | Facility: HOSPITAL | Age: 31
DRG: 816 | End: 2021-04-13
Payer: COMMERCIAL

## 2021-04-13 PROBLEM — G47.34 NOCTURNAL OXYGEN DESATURATION: Status: ACTIVE | Noted: 2021-04-13

## 2021-04-13 PROBLEM — R76.8 HEPATITIS C ANTIBODY POSITIVE IN BLOOD: Status: ACTIVE | Noted: 2021-04-13

## 2021-04-13 LAB
ALBUMIN SERPL BCP-MCNC: 2.9 G/DL (ref 3–5.2)
ALBUMIN SERPL BCP-MCNC: 3 G/DL (ref 3–5.2)
ALBUMIN SERPL BCP-MCNC: 3 G/DL (ref 3–5.2)
ALP SERPL-CCNC: 62 U/L (ref 43–122)
ALP SERPL-CCNC: 64 U/L (ref 43–122)
ALP SERPL-CCNC: 65 U/L (ref 43–122)
ALT SERPL W P-5'-P-CCNC: 378 U/L
ALT SERPL W P-5'-P-CCNC: 391 U/L
ALT SERPL W P-5'-P-CCNC: 420 U/L
ANION GAP SERPL CALCULATED.3IONS-SCNC: 2 MMOL/L (ref 5–14)
ANION GAP SERPL CALCULATED.3IONS-SCNC: 2 MMOL/L (ref 5–14)
ANION GAP SERPL CALCULATED.3IONS-SCNC: 3 MMOL/L (ref 5–14)
AST SERPL W P-5'-P-CCNC: 203 U/L (ref 17–59)
AST SERPL W P-5'-P-CCNC: 216 U/L (ref 17–59)
AST SERPL W P-5'-P-CCNC: 255 U/L (ref 17–59)
BILIRUB SERPL-MCNC: 0.65 MG/DL
BILIRUB SERPL-MCNC: 0.7 MG/DL
BILIRUB SERPL-MCNC: 0.83 MG/DL
BUN SERPL-MCNC: 11 MG/DL (ref 5–25)
BUN SERPL-MCNC: 7 MG/DL (ref 5–25)
BUN SERPL-MCNC: 9 MG/DL (ref 5–25)
CALCIUM ALBUM COR SERPL-MCNC: 9.3 MG/DL (ref 8.3–10.1)
CALCIUM ALBUM COR SERPL-MCNC: 9.3 MG/DL (ref 8.3–10.1)
CALCIUM ALBUM COR SERPL-MCNC: 9.4 MG/DL (ref 8.3–10.1)
CALCIUM SERPL-MCNC: 8.4 MG/DL (ref 8.4–10.2)
CALCIUM SERPL-MCNC: 8.5 MG/DL (ref 8.4–10.2)
CALCIUM SERPL-MCNC: 8.6 MG/DL (ref 8.4–10.2)
CHLORIDE SERPL-SCNC: 107 MMOL/L (ref 97–108)
CHLORIDE SERPL-SCNC: 107 MMOL/L (ref 97–108)
CHLORIDE SERPL-SCNC: 108 MMOL/L (ref 97–108)
CK MB SERPL-MCNC: 1.4 % (ref 0–2.5)
CK MB SERPL-MCNC: 5.9 NG/ML (ref 0–2.4)
CK SERPL-CCNC: 413 U/L (ref 55–170)
CO2 SERPL-SCNC: 28 MMOL/L (ref 22–30)
CO2 SERPL-SCNC: 29 MMOL/L (ref 22–30)
CO2 SERPL-SCNC: 32 MMOL/L (ref 22–30)
CREAT SERPL-MCNC: 0.65 MG/DL (ref 0.7–1.5)
CREAT SERPL-MCNC: 0.68 MG/DL (ref 0.7–1.5)
CREAT SERPL-MCNC: 0.79 MG/DL (ref 0.7–1.5)
GFR SERPL CREATININE-BSD FRML MDRD: 122 ML/MIN/1.73SQ M
GFR SERPL CREATININE-BSD FRML MDRD: 130 ML/MIN/1.73SQ M
GFR SERPL CREATININE-BSD FRML MDRD: 132 ML/MIN/1.73SQ M
GLUCOSE SERPL-MCNC: 82 MG/DL (ref 70–99)
GLUCOSE SERPL-MCNC: 92 MG/DL (ref 70–99)
GLUCOSE SERPL-MCNC: 97 MG/DL (ref 70–99)
HAV IGM SER QL: ABNORMAL
HBV CORE IGM SER QL: ABNORMAL
HBV SURFACE AG SER QL: ABNORMAL
HCV AB SER QL: ABNORMAL
INR PPP: 1.05 (ref 0.84–1.19)
INR PPP: 1.07 (ref 0.84–1.19)
INR PPP: 1.13 (ref 0.84–1.19)
POTASSIUM SERPL-SCNC: 3.9 MMOL/L (ref 3.6–5)
POTASSIUM SERPL-SCNC: 4 MMOL/L (ref 3.6–5)
POTASSIUM SERPL-SCNC: 4.5 MMOL/L (ref 3.6–5)
PROT SERPL-MCNC: 5.7 G/DL (ref 5.9–8.4)
PROT SERPL-MCNC: 5.8 G/DL (ref 5.9–8.4)
PROT SERPL-MCNC: 5.8 G/DL (ref 5.9–8.4)
PROTHROMBIN TIME: 13.8 SECONDS (ref 11.6–14.5)
PROTHROMBIN TIME: 14 SECONDS (ref 11.6–14.5)
PROTHROMBIN TIME: 14.6 SECONDS (ref 11.6–14.5)
SODIUM SERPL-SCNC: 138 MMOL/L (ref 137–147)
SODIUM SERPL-SCNC: 139 MMOL/L (ref 137–147)
SODIUM SERPL-SCNC: 141 MMOL/L (ref 137–147)

## 2021-04-13 PROCEDURE — 80053 COMPREHEN METABOLIC PANEL: CPT | Performed by: EMERGENCY MEDICINE

## 2021-04-13 PROCEDURE — 90746 HEPB VACCINE 3 DOSE ADULT IM: CPT | Performed by: PSYCHIATRY & NEUROLOGY

## 2021-04-13 PROCEDURE — 87522 HEPATITIS C REVRS TRNSCRPJ: CPT | Performed by: PSYCHIATRY & NEUROLOGY

## 2021-04-13 PROCEDURE — 85610 PROTHROMBIN TIME: CPT | Performed by: EMERGENCY MEDICINE

## 2021-04-13 PROCEDURE — 82550 ASSAY OF CK (CPK): CPT | Performed by: INTERNAL MEDICINE

## 2021-04-13 PROCEDURE — 76705 ECHO EXAM OF ABDOMEN: CPT

## 2021-04-13 PROCEDURE — 82553 CREATINE MB FRACTION: CPT | Performed by: INTERNAL MEDICINE

## 2021-04-13 PROCEDURE — 87902 NFCT AGT GNTYP ALYS HEP C: CPT | Performed by: PSYCHIATRY & NEUROLOGY

## 2021-04-13 PROCEDURE — 99232 SBSQ HOSP IP/OBS MODERATE 35: CPT | Performed by: EMERGENCY MEDICINE

## 2021-04-13 RX ADMIN — HEPATITIS B VACCINE (RECOMBINANT) 1 ML: 20 INJECTION, SUSPENSION INTRAMUSCULAR at 15:33

## 2021-04-13 RX ADMIN — SODIUM CHLORIDE 125 ML/HR: 0.9 INJECTION, SOLUTION INTRAVENOUS at 21:29

## 2021-04-13 RX ADMIN — SODIUM CHLORIDE 125 ML/HR: 0.9 INJECTION, SOLUTION INTRAVENOUS at 13:23

## 2021-04-13 RX ADMIN — SODIUM CHLORIDE 125 ML/HR: 0.9 INJECTION, SOLUTION INTRAVENOUS at 04:10

## 2021-04-13 RX ADMIN — ALPRAZOLAM 0.5 MG: 0.5 TABLET ORAL at 13:23

## 2021-04-13 RX ADMIN — ALPRAZOLAM 0.5 MG: 0.5 TABLET ORAL at 21:29

## 2021-04-13 NOTE — UTILIZATION REVIEW
Notification of Inpatient Admission/Inpatient Authorization Request   This is a Notification of Inpatient Admission for [address]  Be advised that this patient was admitted to our facility under Inpatient Status  Contact Myrna Orlando at [phone] for additional admission information  [address] [facility]   DEPT DEDICATED [facility] [phone]  Patient Name:   Jacqueline Rolle   YOB: 1990       [address]   [address]:   [address]  Tax ID: [id number]  NPI: [id number] Attending Provider/NPI:  Phone:  Address: [place], [place] [[id number]]  [phone]  Same as Facility   [facility] Code: [facility]   Start Date: 4/12/21 0433 Discharge Date & Time: No discharge date for patient encounter  Type of Admission: Inpatient Status Discharge Disposition (if discharged): Home/Self Care   Patient Diagnoses: Substance abuse ([facility] ) [F19 10]  Overdose [T50 901A]  Transaminitis [R74 01]  Overdose, accidental or unintentional, initial encounter [T50 901A]   Orders: Admission Orders (From admission, onward)     Ordered        04/12/21 0447  Inpatient Admission  Once                    Assigned Utilization Review Contact: Myrna Orlando  Utilization   Network Utilization Review Department  Phone: [phone]; Fax [phone]  Email: [email or web address]   ATTENTION PAYERS: Please call the assigned Utilization  directly with any questions or concerns ALL voicemails in the department are confidential  Send all requests for admission clinical reviews, approved or denied determinations and any other requests to dedicated fax number belonging to the campus where the patient is receiving treatment

## 2021-04-13 NOTE — NURSING NOTE
Patient SpO2 dropping as low as 79 while patient sleeps  SpO2 quickly recovers to 88-95; however, he is not consistently maintaining adequate oxygenation  HOB elevated 45%  SpO2 sensor changed  Patient placed on 2L O2 via nasal cannula  Will continue to monitor

## 2021-04-13 NOTE — ASSESSMENT & PLAN NOTE
· Hepatitis C antibody positive on hepatitis panel  · GI consult placed, appreciate input  - Ordered further work up with Hep C RNA and genotype  - Ordered BHUMI, anti-smooth muscle, anti-mitochondria, and iron panel to rule out other causes which are in doubt  · Ordered Hep A and B vaccination due to potential risk of future co-infection  - Patient received Engerix-B vaccine  - Follow up vaccines were scheduled in 1 month and 6 month intervals  - Hep A vaccine is currently unavailable and patient should follow up for this as an outpatient

## 2021-04-13 NOTE — PLAN OF CARE
Problem: Potential for Falls  Goal: Patient will remain free of falls  Description: INTERVENTIONS:  - Assess patient frequently for physical needs  -  Identify cognitive and physical deficits and behaviors that affect risk of falls    -  [place] fall precautions as indicated by assessment   - Educate patient/family on patient safety including physical limitations  - Instruct patient to call for assistance with activity based on assessment  - Modify environment to reduce risk of injury  - Consider OT/PT consult to assist with strengthening/mobility  Outcome: Progressing     Problem: Prexisting or High Potential for Compromised Skin Integrity  Goal: Skin integrity is maintained or improved  Description: INTERVENTIONS:  - Identify patients at risk for skin breakdown  - Assess and monitor skin integrity  - Assess and monitor nutrition and hydration status  - Monitor labs   - Assess for incontinence   - Turn and reposition patient  - Assist with mobility/ambulation  - Relieve pressure over bony prominences  - Avoid friction and shearing  - Provide appropriate hygiene as needed including keeping skin clean and dry  - Evaluate need for skin moisturizer/barrier cream  - Collaborate with interdisciplinary team   - Patient/family teaching  - Consider wound care consult   Outcome: Progressing     Problem: PAIN - ADULT  Goal: Verbalizes/displays adequate comfort level or baseline comfort level  Description: Interventions:  - Encourage patient to monitor pain and request assistance  - Assess pain using appropriate pain scale  - Administer analgesics based on type and severity of pain and evaluate response  - Implement non-pharmacological measures as appropriate and evaluate response  - Consider cultural and social influences on pain and pain management  - Notify physician/advanced practitioner if interventions unsuccessful or patient reports new pain  Outcome: Progressing     Problem: INFECTION - ADULT  Goal: Absence or prevention of progression during hospitalization  Description: INTERVENTIONS:  - Assess and monitor for signs and symptoms of infection  - Monitor lab/diagnostic results  - Monitor all insertion sites, i e  indwelling lines, tubes, and drains  - Monitor endotracheal if appropriate and nasal secretions for changes in amount and color  - [place] appropriate cooling/warming therapies per order  - Administer medications as ordered  - Instruct and encourage patient and family to use good hand hygiene technique  - Identify and instruct in appropriate isolation precautions for identified infection/condition  Outcome: Progressing  Goal: Absence of fever/infection during neutropenic period  Description: INTERVENTIONS:  - Monitor WBC    Outcome: Progressing     Problem: SAFETY ADULT  Goal: Patient will remain free of falls  Description: INTERVENTIONS:  - Assess patient frequently for physical needs  -  Identify cognitive and physical deficits and behaviors that affect risk of falls    -  [place] fall precautions as indicated by assessment   - Educate patient/family on patient safety including physical limitations  - Instruct patient to call for assistance with activity based on assessment  - Modify environment to reduce risk of injury  - Consider OT/PT consult to assist with strengthening/mobility  Outcome: Progressing  Goal: Maintain or return to baseline ADL function  Description: INTERVENTIONS:  -  Assess patient's ability to carry out ADLs; assess patient's baseline for ADL function and identify physical deficits which impact ability to perform ADLs (bathing, care of mouth/teeth, toileting, grooming, dressing, etc )  - Assess/evaluate cause of self-care deficits   - Assess range of motion  - Assess patient's mobility; develop plan if impaired  - Assess patient's need for assistive devices and provide as appropriate  - Encourage maximum independence but intervene and supervise when necessary  - Involve family in performance of ADLs  - Assess for home care needs following discharge   - Consider OT consult to assist with ADL evaluation and planning for discharge  - Provide patient education as appropriate  Outcome: Progressing  Goal: Maintain or return mobility status to optimal level  Description: INTERVENTIONS:  - Assess patient's baseline mobility status (ambulation, transfers, stairs, etc )    - Identify cognitive and physical deficits and behaviors that affect mobility  - Identify mobility aids required to assist with transfers and/or ambulation (gait belt, sit-to-stand, lift, walker, cane, etc )  - [place] fall precautions as indicated by assessment  - Record patient progress and toleration of activity level on Mobility SBAR; progress patient to next Phase/Stage  - Instruct patient to call for assistance with activity based on assessment  - Consider rehabilitation consult to assist with strengthening/weightbearing, etc   Outcome: Progressing     Problem: DISCHARGE PLANNING  Goal: Discharge to home or other facility with appropriate resources  Description: INTERVENTIONS:  - Identify barriers to discharge w/patient and caregiver  - Arrange for needed discharge resources and transportation as appropriate  - Identify discharge learning needs (meds, wound care, etc )  - Arrange for interpretive services to assist at discharge as needed  - Refer to Case Management Department for coordinating discharge planning if the patient needs post-hospital services based on physician/advanced practitioner order or complex needs related to functional status, cognitive ability, or social support system  Outcome: Progressing     Problem: Knowledge Deficit  Goal: Patient/family/caregiver demonstrates understanding of disease process, treatment plan, medications, and discharge instructions  Description: Complete learning assessment and assess knowledge base    Interventions:  - Provide teaching at level of understanding  - Provide teaching via preferred learning methods  Outcome: Progressing     Problem: Nutrition/Hydration-ADULT  Goal: Nutrient/Hydration intake appropriate for improving, restoring or maintaining nutritional needs  Description: Monitor and assess patient's nutrition/hydration status for malnutrition  Collaborate with interdisciplinary team and initiate plan and interventions as ordered  Monitor patient's weight and dietary intake as ordered or per policy  Utilize nutrition screening tool and intervene as necessary  Determine patient's food preferences and provide high-protein, high-caloric foods as appropriate       INTERVENTIONS:  - Monitor oral intake, urinary output, labs, and treatment plans  - Assess nutrition and hydration status and recommend course of action  - Evaluate amount of meals eaten  - Assist patient with eating if necessary   - Allow adequate time for meals  - Recommend/ encourage appropriate diets, oral nutritional supplements, and vitamin/mineral supplements  - Order, calculate, and assess calorie counts as needed  - Recommend, monitor, and adjust tube feedings and TPN/PPN based on assessed needs  - Assess need for intravenous fluids  - Provide specific nutrition/hydration education as appropriate  - Include patient/family/caregiver in decisions related to nutrition  Outcome: Progressing

## 2021-04-13 NOTE — CASE MANAGEMENT
CM informed pt not medically cleared for d/c  CM spoke with pt's  Kimberly Arroyo, update provided  Kimberly Arroyo reports pt will not be considered in violation if he does not enter rehab by Friday  Kimberly Arroyo reports only if pt refuses inpatient rehab treatment  CM to maintain contact with Kimberly Arroyo regarding pt's d/c date and plan

## 2021-04-13 NOTE — NURSING NOTE
Patient 23:00 COWs is 0 (zero)  Patient states he has no anxiety (0 5mg xanax administered at 21:15); patient denies stomach upset/cramping  No yawning, rhinorrhea, no complaints of pain other than a headache not related to detox  Pupils WNL  Patient HR is a little bradycardic  Elevated HOB while patient is sleeping  Will continue to monitor

## 2021-04-13 NOTE — NURSING NOTE
Patient out of bed supervised to bathroom  Patient showered with supervision; oral care, deodorant applied  Powder applied to folds of skin  Patient given a phone to speak briefly with friend, Estefanía Johnson, to inform her he is here and safe
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
## 2021-04-13 NOTE — PLAN OF CARE
Problem: Potential for Falls  Goal: Patient will remain free of falls  Description: INTERVENTIONS:  - Assess patient frequently for physical needs  -  Identify cognitive and physical deficits and behaviors that affect risk of falls    -  [place] fall precautions as indicated by assessment   - Educate patient/family on patient safety including physical limitations  - Instruct patient to call for assistance with activity based on assessment  - Modify environment to reduce risk of injury  - Consider OT/PT consult to assist with strengthening/mobility  Outcome: Progressing     Problem: Prexisting or High Potential for Compromised Skin Integrity  Goal: Skin integrity is maintained or improved  Description: INTERVENTIONS:  - Identify patients at risk for skin breakdown  - Assess and monitor skin integrity  - Assess and monitor nutrition and hydration status  - Monitor labs   - Assess for incontinence   - Turn and reposition patient  - Assist with mobility/ambulation  - Relieve pressure over bony prominences  - Avoid friction and shearing  - Provide appropriate hygiene as needed including keeping skin clean and dry  - Evaluate need for skin moisturizer/barrier cream  - Collaborate with interdisciplinary team   - Patient/family teaching  - Consider wound care consult   Outcome: Progressing     Problem: PAIN - ADULT  Goal: Verbalizes/displays adequate comfort level or baseline comfort level  Description: Interventions:  - Encourage patient to monitor pain and request assistance  - Assess pain using appropriate pain scale  - Administer analgesics based on type and severity of pain and evaluate response  - Implement non-pharmacological measures as appropriate and evaluate response  - Consider cultural and social influences on pain and pain management  - Notify physician/advanced practitioner if interventions unsuccessful or patient reports new pain  Outcome: Progressing     Problem: INFECTION - ADULT  Goal: Absence or prevention of progression during hospitalization  Description: INTERVENTIONS:  - Assess and monitor for signs and symptoms of infection  - Monitor lab/diagnostic results  - Monitor all insertion sites, i e  indwelling lines, tubes, and drains  - Monitor endotracheal if appropriate and nasal secretions for changes in amount and color  - [place] appropriate cooling/warming therapies per order  - Administer medications as ordered  - Instruct and encourage patient and family to use good hand hygiene technique  - Identify and instruct in appropriate isolation precautions for identified infection/condition  Outcome: Progressing  Goal: Absence of fever/infection during neutropenic period  Description: INTERVENTIONS:  - Monitor WBC    Outcome: Progressing     Problem: SAFETY ADULT  Goal: Patient will remain free of falls  Description: INTERVENTIONS:  - Assess patient frequently for physical needs  -  Identify cognitive and physical deficits and behaviors that affect risk of falls    -  [place] fall precautions as indicated by assessment   - Educate patient/family on patient safety including physical limitations  - Instruct patient to call for assistance with activity based on assessment  - Modify environment to reduce risk of injury  - Consider OT/PT consult to assist with strengthening/mobility  Outcome: Progressing  Goal: Maintain or return to baseline ADL function  Description: INTERVENTIONS:  -  Assess patient's ability to carry out ADLs; assess patient's baseline for ADL function and identify physical deficits which impact ability to perform ADLs (bathing, care of mouth/teeth, toileting, grooming, dressing, etc )  - Assess/evaluate cause of self-care deficits   - Assess range of motion  - Assess patient's mobility; develop plan if impaired  - Assess patient's need for assistive devices and provide as appropriate  - Encourage maximum independence but intervene and supervise when necessary  - Involve family in performance of ADLs  - Assess for home care needs following discharge   - Consider OT consult to assist with ADL evaluation and planning for discharge  - Provide patient education as appropriate  Outcome: Progressing  Goal: Maintain or return mobility status to optimal level  Description: INTERVENTIONS:  - Assess patient's baseline mobility status (ambulation, transfers, stairs, etc )    - Identify cognitive and physical deficits and behaviors that affect mobility  - Identify mobility aids required to assist with transfers and/or ambulation (gait belt, sit-to-stand, lift, walker, cane, etc )  - [place] fall precautions as indicated by assessment  - Record patient progress and toleration of activity level on Mobility SBAR; progress patient to next Phase/Stage  - Instruct patient to call for assistance with activity based on assessment  - Consider rehabilitation consult to assist with strengthening/weightbearing, etc   Outcome: Progressing     Problem: Knowledge Deficit  Goal: Patient/family/caregiver demonstrates understanding of disease process, treatment plan, medications, and discharge instructions  Description: Complete learning assessment and assess knowledge base  Interventions:  - Provide teaching at level of understanding  - Provide teaching via preferred learning methods  Outcome: Progressing     Problem: Nutrition/Hydration-ADULT  Goal: Nutrient/Hydration intake appropriate for improving, restoring or maintaining nutritional needs  Description: Monitor and assess patient's nutrition/hydration status for malnutrition  Collaborate with interdisciplinary team and initiate plan and interventions as ordered  Monitor patient's weight and dietary intake as ordered or per policy  Utilize nutrition screening tool and intervene as necessary  Determine patient's food preferences and provide high-protein, high-caloric foods as appropriate       INTERVENTIONS:  - Monitor oral intake, urinary output, labs, and treatment plans  - Assess nutrition and hydration status and recommend course of action  - Evaluate amount of meals eaten  - Assist patient with eating if necessary   - Allow adequate time for meals  - Recommend/ encourage appropriate diets, oral nutritional supplements, and vitamin/mineral supplements  - Order, calculate, and assess calorie counts as needed  - Recommend, monitor, and adjust tube feedings and TPN/PPN based on assessed needs  - Assess need for intravenous fluids  - Provide specific nutrition/hydration education as appropriate  - Include patient/family/caregiver in decisions related to nutrition  Outcome: Progressing

## 2021-04-14 PROBLEM — G47.00 INSOMNIA: Status: ACTIVE | Noted: 2018-12-10

## 2021-04-14 LAB
ALBUMIN SERPL BCP-MCNC: 3.1 G/DL (ref 3–5.2)
ALP SERPL-CCNC: 72 U/L (ref 43–122)
ALT SERPL W P-5'-P-CCNC: 418 U/L
ANION GAP SERPL CALCULATED.3IONS-SCNC: 3 MMOL/L (ref 5–14)
AST SERPL W P-5'-P-CCNC: 246 U/L (ref 17–59)
BILIRUB SERPL-MCNC: 1.12 MG/DL
BUN SERPL-MCNC: 4 MG/DL (ref 5–25)
CALCIUM ALBUM COR SERPL-MCNC: 9.4 MG/DL (ref 8.3–10.1)
CALCIUM SERPL-MCNC: 8.7 MG/DL (ref 8.4–10.2)
CHLORIDE SERPL-SCNC: 104 MMOL/L (ref 97–108)
CK SERPL-CCNC: 135 U/L (ref 55–170)
CO2 SERPL-SCNC: 30 MMOL/L (ref 22–30)
CREAT SERPL-MCNC: 0.72 MG/DL (ref 0.7–1.5)
GFR SERPL CREATININE-BSD FRML MDRD: 127 ML/MIN/1.73SQ M
GLUCOSE SERPL-MCNC: 98 MG/DL (ref 70–99)
HCV RNA SERPL NAA+PROBE-ACNC: NORMAL IU/ML
HCV RNA SERPL NAA+PROBE-LOG IU: 6.87 LOG10 IU/ML
POTASSIUM SERPL-SCNC: 3.5 MMOL/L (ref 3.6–5)
PROT SERPL-MCNC: 6.1 G/DL (ref 5.9–8.4)
SODIUM SERPL-SCNC: 137 MMOL/L (ref 137–147)
TEST INFORMATION: NORMAL

## 2021-04-14 PROCEDURE — 99232 SBSQ HOSP IP/OBS MODERATE 35: CPT | Performed by: EMERGENCY MEDICINE

## 2021-04-14 PROCEDURE — 99222 1ST HOSP IP/OBS MODERATE 55: CPT | Performed by: PHYSICIAN ASSISTANT

## 2021-04-14 PROCEDURE — 80053 COMPREHEN METABOLIC PANEL: CPT | Performed by: PSYCHIATRY & NEUROLOGY

## 2021-04-14 PROCEDURE — 82550 ASSAY OF CK (CPK): CPT | Performed by: PSYCHIATRY & NEUROLOGY

## 2021-04-14 RX ORDER — TRAZODONE HYDROCHLORIDE 100 MG/1
100 TABLET ORAL
Status: DISCONTINUED | OUTPATIENT
Start: 2021-04-14 | End: 2021-04-15 | Stop reason: HOSPADM

## 2021-04-14 RX ORDER — BUPRENORPHINE AND NALOXONE 8; 2 MG/1; MG/1
8 FILM, SOLUBLE BUCCAL; SUBLINGUAL DAILY
Status: DISCONTINUED | OUTPATIENT
Start: 2021-04-14 | End: 2021-04-15 | Stop reason: HOSPADM

## 2021-04-14 RX ADMIN — SODIUM CHLORIDE 125 ML/HR: 0.9 INJECTION, SOLUTION INTRAVENOUS at 04:42

## 2021-04-14 RX ADMIN — ALPRAZOLAM 0.5 MG: 0.5 TABLET ORAL at 08:47

## 2021-04-14 RX ADMIN — ALPRAZOLAM 0.5 MG: 0.5 TABLET ORAL at 21:19

## 2021-04-14 RX ADMIN — BUPRENORPHINE AND NALOXONE 8 MG: 8; 2 FILM BUCCAL; SUBLINGUAL at 11:50

## 2021-04-14 RX ADMIN — TRAZODONE HYDROCHLORIDE 100 MG: 100 TABLET ORAL at 21:19

## 2021-04-14 NOTE — CONSULTS
Patient MRN: [id number]  Date of Service: 4/14/2021  Referring Provider: Kristal Zhang MD  Provider Creating Note: Ricky Floyd PA-C  PCP: Emiliano Gautam    Reason for Consult: Elevated LFTs, Hepatitis C    HISTORY OF PRESENT ILLNESS:  Cris Loja is a 31 y o  male admitted yesterday to [address] [place] [address] [facility] for medically-assisted opioid withdrawal following heroin OD requiring Narcan  PMHx significant for opioid use disorder with use of IV heroin, meth, K2, xanax, MDMA  No past history of liver disease  No significant alcohol use recently  He states he cut back significantly about 5 years ago-at that time he would drink 4 LOCOs on the weekends  GI evaluation requested for elevated LFTs with Hepatitis C Ab positive  LFTs from 2020 normal  On admission, , , ALP 64, Tb 0 83  Tylenol level <10 - he was given NAC which has been discontinued  Evidence of mild rhabdomyolysis with total CK 1134, CKMB 10 6  Abdominal US shows slightly enlarged liver and large gallstone  HIV negative  Hepatitis C viral load and genotype pending  He denies any prior history of liver disease  No family history of liver problems  He does have a homemade tattoo on his right forearm  He denies prior blood transfusions or recent travel  He does share needles with IVDU  He does not have known viral hepatitis exposure  He denies abdominal pain, nausea, vomiting, jaundice, pruritus  + fatigue  Review of Systems:    General:   No fever or chills; No significant weight loss or gain  EENT:   No ear pain, facial swelling; No sneezing, sore throat  Skin:   No rashes, color changes  Respiratory:     No shortness of breath, cough, wheezing, stridor  Cardiovascular:     No chest pain, palpitations  Gastrointestinal:    As per HPI  Musculoskeletal:     No arthralgias, myalgias, swelling  Neurologic:   No dizziness, numbness, weakness  No speech difficulties     Psych:   No agitation, suicidal ideations Otherwise, All other twelve-point review of systems normal      Past Medical History:   Diagnosis Date    Allergic     Development delay     Obesity      Past Surgical History:   Procedure Laterality Date    EAR SURGERY       Allergies   Allergen Reactions    Seasonal Ic [Cholestatin] Nasal Congestion    Penicillins      unknown       Medications:  Home Medications  Prior to Admission medications    Medication Sig Start Date End Date Taking? Authorizing Provider   busPIRone (BUSPAR) 15 mg tablet Take 15 mg by mouth 2 (two) times a day   Yes Historical Provider, MD   QUEtiapine (SEROquel) 400 MG tablet Take 400 mg by mouth daily at bedtime   Yes Historical Provider, MD   traZODone (DESYREL) 100 mg tablet Take 10 mg by mouth   Yes Historical Provider, MD   ibuprofen (ADVIL,MOTRIN) 100 MG tablet Take 1 tablet (100 mg total) by mouth every 8 (eight) hours as needed for moderate pain  Patient not taking: Reported on 5/18/2020 12/12/18   Lou-Ann Red Litten, CRNP   loratadine (CLARITIN) 10 mg tablet Take 1 tablet (10 mg total) by mouth daily  Patient not taking: Reported on 5/18/2020 1/9/19   David Kirkland MD   loratadine (CLARITIN) 10 mg tablet TAKE 1 TABLET BY MOUTH DAILY AT 8A * (ALLERGIES)  Patient not taking: Reported on 5/18/2020 1/9/19   David Kirkland MD   Melatonin ER 5 MG TBCR Take 5 mg by mouth daily at bedtime as needed (prn) May repeat dose in one hour if needed    Patient not taking: Reported on 5/18/2020 12/12/18   KIRTI Riddle       Inhouse Medications    Current Facility-Administered Medications:     ALPRAZolam Earleen Bump) tablet 0 5 mg, 0 5 mg, Oral, BID, 0 5 mg at 04/13/21 2129    cloNIDine (CATAPRES) tablet 0 1 mg, 0 1 mg, Oral, Q6H PRN    gabapentin (NEURONTIN) capsule 300 mg, 300 mg, Oral, Q8H PRN    ibuprofen (MOTRIN) tablet 600 mg, 600 mg, Oral, Q6H PRN, 600 mg at 04/12/21 2315    ondansetron (ZOFRAN-ODT) dispersible tablet 4 mg, 4 mg, Oral, Q6H PRN, 4 mg at 04/12/21 1642    sodium chloride 0 9 % infusion, 125 mL/hr, Intravenous, Continuous, 125 mL/hr at 04/14/21 3981      Social History   reports that he has quit smoking  He smoked 0 00 packs per day  He has never used smokeless tobacco  He reports current alcohol use  He reports current drug use  Drugs: MDMA (ecstacy), Methamphetamines, Fentanyl, and Heroin  Family History  History reviewed  No pertinent family history  OBJECTIVE:    /79 (BP Location: Right arm)   Pulse 67   Temp 99 4 °F (37 4 °C) (Temporal)   Resp 18   Ht 6' (1 829 m)   Wt 134 kg (295 lb)   SpO2 92%   BMI 40 01 kg/m²   Physical Exam:     General Appearance:    Awake, alert, oriented, no distress, well developed, well    nourished   Head:    Normocephalic without obvious abnormality   Eyes:    PERRL, conjunctiva/corneas clear, EOM's intact        Neck:   Supple, no adenopathy   Throat:   Mucous membranes moist   Lungs:     Clear to auscultation bilaterally, no wheezing or rhonchi   Heart:    Regular rate and rhythm, S1 and S2 normal, no murmur   Abdomen:     Soft, obese,  non-tender, non-distended  bowel sounds active  No masses, rebound or guarding  Extremities:   Extremities normal  No clubbing, cyanosis or edema   Psych  Derm:   Normal mood    No jaundice  No palmar erythema or spider angiomata noted on exam    Neurologic:   CNII-XII grossly intact   Speech intact         Laboratory Studies:  Results from last 7 days   Lab Units 04/13/21  1134 04/13/21  0553 04/13/21  0023 04/12/21  1748 04/12/21  1045 04/12/21  0347   WBC Thousand/uL  --   --   --   --  8 40 8 70   HEMOGLOBIN g/dL  --   --   --   --  12 7* 13 9   HEMATOCRIT %  --   --   --   --  38 0* 40 6*   MCV fL  --   --   --   --  91 91   PLATELETS Thousands/uL  --   --   --   --  193 211   INR  1 13 1 07 1 05 1 12 1 07  --      Results from last 7 days   Lab Units 04/13/21  1134 04/13/21  0552 04/13/21  0023 04/12/21  1748 04/12/21  1045 04/12/21  0347   SODIUM mmol/L 141 139 138 138 137 135*   POTASSIUM mmol/L 4 5 4 0 3 9 3 9 4 2 4 1   CHLORIDE mmol/L 107 108 107 104 102 98   CO2 mmol/L 32* 29 28 30 32* 30   BUN mg/dL 7 9 11 12 12 13   CREATININE mg/dL 0 79 0 65* 0 68* 0 78 0 88 0 96   CALCIUM mg/dL 8 6 8 5 8 4 8 5 9 0 8 8   ALBUMIN g/dL 3 0 3 0 2 9* 3 1 3 4 4 1   TOTAL BILIRUBIN mg/dL 0 83 0 65 0 70 0 95 1 54* 2 77*   ALK PHOS U/L 64 65 62 62 74 79   ALT U/L 420* 391* 378* 397* 451* 569*   AST U/L 255* 216* 203* 201* 263* 457*           Imaging and Other Studies:  Xr Chest 1 View Portable    Result Date: 4/12/2021  Narrative: CHEST INDICATION:   CP  COMPARISON:  None EXAM PERFORMED/VIEWS:  XR CHEST PORTABLE FINDINGS: Cardiomediastinal silhouette appears unremarkable  The lungs are clear  No pneumothorax or pleural effusion  Osseous structures appear within normal limits for patient age  Impression: No acute cardiopulmonary disease  Workstation performed: [id number]     Us Right Upper Quadrant    Result Date: 4/13/2021  Narrative: RIGHT UPPER QUADRANT ULTRASOUND INDICATION:     liver dysfunction  COMPARISON:  None TECHNIQUE:   Real-time ultrasound of the right upper quadrant was performed with a curvilinear transducer with both volumetric sweeps and still imaging techniques  FINDINGS: Study limited by body habitus and bowel gas  PANCREAS:  Portions of the pancreas are obscured by bowel gas  Visualized portions of the pancreas are unremarkable  AORTA AND IVC:  Obscured by bowel gas  LIVER: Size:  Slightly enlarged  The liver measures 17 3 cm in the midclavicular line  Contour:  Surface contour is smooth  Parenchyma:  Echogenicity and echotexture are within normal limits  No evidence of suspicious mass  Limited imaging of the main portal vein shows it to be patent and hepatopetal   BILIARY: The gallbladder is normal in caliber  No wall thickening or pericholecystic fluid  There is a 2 2 cm gallstone  No sonographic Hill sign  No intrahepatic biliary dilatation  CBD measures 4 mm   No choledocholithiasis  KIDNEY: Right kidney measures 10 5 x 5 8 x 5 5 cm  Within normal limits  ASCITES:   None  Impression: Limited by body habitus and bowel gas  Large gallstone  Slight liver enlargement though possibly related to body habitus  Workstation performed: [id number]         ASSESSMENT AND PLAN:  3 31year old male admitted with heroin OD s/p Narcan  Mgmt per [facility]  2  Elevated liver enzymes, hepatocellular pattern- overall improved from admission  Hepatitis C Ab positive, unclear if acute vs chronic infection  Mild rhabdomyolysis also possibly contributing  No evidence of cirrhosis on imaging  Liver synthetic function appears intact with normal platelets and INR  Hepatitis C viral load and genotype pending  Will need outpatient follow-up for possible treatment  Will need ongoing support from drug abuse  Continue to trend LFTs  Will rule out other causes of liver disease (doubt)  Avoid hepatotoxins  We discussed modifiable risk factors associated with accelerated liver disease such as alcohol use, tobacco use, obesity  Recommend hepatitis A/B vaccinations if not done previously        Skyler Pimentel PA-C

## 2021-04-14 NOTE — CASE MANAGEMENT
Pt requesting inpt rehab within [facility]; reports preference is [place] location  Pt in agreement with any location but refusing [place]  Call made to [facility] #[phone], spoke with Ashley Moore; requested inpt rehab bed for Thurs 4/15  Ashley Teresa reports bed would be available for pt tomorrow although she will only hold bed x2 hours  Pt must call in to complete phone intake within that time  CM met with pt, informed pt of above info  Pt calling [facility] at this time

## 2021-04-14 NOTE — CASE MANAGEMENT
Call made to [facility] #[phone], spoke with Alyssa Morocho, informed pt is accepted for inpt rehab at [facility] admission on 4/15  [facility] transportation scheduled to  pt at 430pm  CM requested that [facility] contact unit if any delay in transportation  Call made to pt's PO Donna Correa, informed her of pt's d/c plan, date, and time  Ni Greenfield requesting email sent confirming pt's d/c info  Pt notified of acceptance by [facility], insurance approval, and d/c transport time

## 2021-04-14 NOTE — CASE MANAGEMENT
Call made to [facility], informed pt follows at [facility] location #[phone]  CM spoke with pt's counselor Georgia Moreno ext [phone]; informed him of plan for inpt rehab post d/c and initiating suboxone  CM confirmed that [facility] is able to continue with suboxone maintenance  Georgia Moreno to forward CM's request for suboxone maintenance appt  CM awaiting c/b with appt date and time
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
## 2021-04-14 NOTE — CASE MANAGEMENT
Call made to [facility], tel# [phone], spoke with Rea Sawant, clinical provided with request for level 3 5R rehab  Pt approved for 4 days, start of care 4/15  [facility] to call [address] [place] [address] upon pt's arrival     CM spoke with pt who reports having completed phone intake with [facility]  NILO spoke with Yudy at [facility] intake tel# [phone]; reports intake was completed  CM to fax clinical to Yudy at fax# [phone]  CM informed Yudy of plan for outpatient suboxone maintenance through [facility]  Yudy reports pt will require 2 week supply of medications in hand for admission  CM also informed Yudy of [facility]  Yudy to review clinical and contact CM with final determination  Clinical faxed to Yudy

## 2021-04-15 VITALS
HEART RATE: 67 BPM | DIASTOLIC BLOOD PRESSURE: 64 MMHG | TEMPERATURE: 98.7 F | OXYGEN SATURATION: 93 % | HEIGHT: 72 IN | WEIGHT: 295 LBS | BODY MASS INDEX: 39.96 KG/M2 | SYSTOLIC BLOOD PRESSURE: 142 MMHG | RESPIRATION RATE: 18 BRPM

## 2021-04-15 PROBLEM — F13.20 BENZODIAZEPINE DEPENDENCE (HCC): Chronic | Status: ACTIVE | Noted: 2021-04-15

## 2021-04-15 PROBLEM — G47.00 INSOMNIA: Status: RESOLVED | Noted: 2018-12-10 | Resolved: 2021-04-15

## 2021-04-15 PROBLEM — F13.20 BENZODIAZEPINE DEPENDENCE (HCC): Status: ACTIVE | Noted: 2021-04-15

## 2021-04-15 LAB
ALBUMIN SERPL BCP-MCNC: 3.4 G/DL (ref 3–5.2)
ALP SERPL-CCNC: 70 U/L (ref 43–122)
ALT SERPL W P-5'-P-CCNC: 375 U/L
AST SERPL W P-5'-P-CCNC: 199 U/L (ref 17–59)
BILIRUB DIRECT SERPL-MCNC: 0.29 MG/DL
BILIRUB SERPL-MCNC: 0.86 MG/DL
FERRITIN SERPL-MCNC: 210 NG/ML (ref 8–388)
GLUCOSE SERPL-MCNC: 94 MG/DL (ref 65–140)
IRON SATN MFR SERPL: 15 %
IRON SERPL-MCNC: 48 UG/DL (ref 65–175)
PROT SERPL-MCNC: 6.5 G/DL (ref 5.9–8.4)
TIBC SERPL-MCNC: 312 UG/DL (ref 250–450)

## 2021-04-15 PROCEDURE — 82948 REAGENT STRIP/BLOOD GLUCOSE: CPT

## 2021-04-15 PROCEDURE — 82728 ASSAY OF FERRITIN: CPT | Performed by: PHYSICIAN ASSISTANT

## 2021-04-15 PROCEDURE — 99232 SBSQ HOSP IP/OBS MODERATE 35: CPT | Performed by: PHYSICIAN ASSISTANT

## 2021-04-15 PROCEDURE — 80076 HEPATIC FUNCTION PANEL: CPT | Performed by: PHYSICIAN ASSISTANT

## 2021-04-15 PROCEDURE — 82390 ASSAY OF CERULOPLASMIN: CPT | Performed by: PHYSICIAN ASSISTANT

## 2021-04-15 PROCEDURE — 86038 ANTINUCLEAR ANTIBODIES: CPT | Performed by: PHYSICIAN ASSISTANT

## 2021-04-15 PROCEDURE — 83540 ASSAY OF IRON: CPT | Performed by: PHYSICIAN ASSISTANT

## 2021-04-15 PROCEDURE — 83550 IRON BINDING TEST: CPT | Performed by: PHYSICIAN ASSISTANT

## 2021-04-15 PROCEDURE — 99238 HOSP IP/OBS DSCHRG MGMT 30/<: CPT | Performed by: EMERGENCY MEDICINE

## 2021-04-15 PROCEDURE — 86256 FLUORESCENT ANTIBODY TITER: CPT | Performed by: PHYSICIAN ASSISTANT

## 2021-04-15 PROCEDURE — 86235 NUCLEAR ANTIGEN ANTIBODY: CPT | Performed by: PHYSICIAN ASSISTANT

## 2021-04-15 RX ORDER — BUPRENORPHINE AND NALOXONE 8; 2 MG/1; MG/1
8 FILM, SOLUBLE BUCCAL; SUBLINGUAL DAILY
Qty: 15 FILM | Refills: 0 | Status: SHIPPED | OUTPATIENT
Start: 2021-04-16 | End: 2021-05-01

## 2021-04-15 RX ORDER — TRAZODONE HYDROCHLORIDE 100 MG/1
100 TABLET ORAL
Qty: 16 TABLET | Refills: 0 | Status: SHIPPED | OUTPATIENT
Start: 2021-04-15 | End: 2021-05-01

## 2021-04-15 RX ORDER — ALPRAZOLAM 0.5 MG/1
0.5 TABLET ORAL 2 TIMES DAILY PRN
Qty: 15 TABLET | Refills: 0 | Status: SHIPPED | OUTPATIENT
Start: 2021-04-15 | End: 2021-04-30

## 2021-04-15 RX ADMIN — BUPRENORPHINE AND NALOXONE 8 MG: 8; 2 FILM BUCCAL; SUBLINGUAL at 08:01

## 2021-04-15 RX ADMIN — ALPRAZOLAM 0.5 MG: 0.5 TABLET ORAL at 08:00

## 2021-04-15 NOTE — PROGRESS NOTES
Patient Name: Shivani De Jesus  Patient MRN: [id number]  Date: 04/15/21  Service: [facility]    Subjective   Sleeping upon arrival  No gi complaints  No fever  Eating/sleeping okay  Vitals  Blood pressure 150/69, pulse 80, temperature 98 7 °F (37 1 °C), temperature source Temporal, resp  rate 19, height 6' (1 829 m), weight 134 kg (295 lb), SpO2 94 %  Physical Exam:     General Appearance:    Awake, alert, oriented x3, no distress, well developed, well    nourished   Head:    Normocephalic without obvious abnormality   Eyes:    PERRL, conjunctiva/corneas clear, EOM's intact        Neck:   Supple, no adenopathy   Throat:   Mucous membranes moist   Lungs:     Clear to auscultation bilaterally, no wheezing or rhonchi   Heart:    Regular rate and rhythm, S1 and S2 normal, no murmur   Abdomen:     Soft, protuberant, non-tender, non-distended  bowel sounds active  No  masses, rebound or guarding  Extremities:   Extremities normal  No clubbing, cyanosis or edema   Psych  Derm:   Normal affect    No jaundice   Neurologic:   CNII-XII grossly intact   Speech intact         Laboratory Studies  Results from last 7 days   Lab Units 04/13/21  1134 04/13/21  0553 04/13/21  0023 04/12/21  1748 04/12/21  1045 04/12/21  0347   WBC Thousand/uL  --   --   --   --  8 40 8 70   HEMOGLOBIN g/dL  --   --   --   --  12 7* 13 9   HEMATOCRIT %  --   --   --   --  38 0* 40 6*   PLATELETS Thousands/uL  --   --   --   --  193 211   INR  1 13 1 07 1 05 1 12 1 07  --      Results from last 7 days   Lab Units 04/15/21  0541 04/14/21  0903 04/13/21  1134 04/13/21  0552 04/13/21  0023 04/12/21  1748   SODIUM mmol/L  --  137 141 139 138 138   POTASSIUM mmol/L  --  3 5* 4 5 4 0 3 9 3 9   CHLORIDE mmol/L  --  104 107 108 107 104   CO2 mmol/L  --  30 32* 29 28 30   BUN mg/dL  --  4* 7 9 11 12   CREATININE mg/dL  --  0 72 0 79 0 65* 0 68* 0 78   CALCIUM mg/dL  --  8 7 8 6 8 5 8 4 8 5   ALBUMIN g/dL 3 4 3 1 3 0 3 0 2 9* 3 1 TOTAL BILIRUBIN mg/dL 0 86 1 12 0 83 0 65 0 70 0 95   ALK PHOS U/L 70 72 64 65 62 62   ALT U/L 375* 418* 420* 391* 378* 397*   AST U/L 199* 246* 255* 216* 203* 201*         Imaging and Other Studies      Inhouse Medications     Current Facility-Administered Medications:     ALPRAZolam (XANAX) tablet 0 5 mg, 0 5 mg, Oral, BID, 0 5 mg at 04/14/21 2119    buprenorphine-naloxone (Suboxone) film 8 mg, 8 mg, Sublingual, Daily, 8 mg at 04/14/21 1150    cloNIDine (CATAPRES) tablet 0 1 mg, 0 1 mg, Oral, Q6H PRN    gabapentin (NEURONTIN) capsule 300 mg, 300 mg, Oral, Q8H PRN    [START ON 5/13/2021] hepatitis B vaccine (ENGERIX-B) IM injection 1 mL, 1 mL, Intramuscular, Once    [START ON 10/13/2021] hepatitis B vaccine (ENGERIX-B) IM injection 1 mL, 1 mL, Intramuscular, Once    ibuprofen (MOTRIN) tablet 600 mg, 600 mg, Oral, Q6H PRN, 600 mg at 04/12/21 2315    ondansetron (ZOFRAN-ODT) dispersible tablet 4 mg, 4 mg, Oral, Q6H PRN, 4 mg at 04/12/21 1642    traZODone (DESYREL) tablet 100 mg, 100 mg, Oral, HS, 100 mg at 04/14/21 2119      Assessment/Plan:    3 31year old male admitted with heroin OD s/p Narcan  Mgmt per Detox  2  Elevated liver enzymes, hepatocellular pattern- trending down  Hepatitis C Ab positive with high viral load (6 873 log 10IU/mL), unclear if acute vs chronic infection  Mild rhabdomyolysis could have also contributed to small degree  No evidence of cirrhosis on imaging  Liver synthetic function appears intact with normal platelets and INR  Hepatitis C genotype pending  Will need outpatient follow-up for possible treatment  Will need ongoing support from drug abuse  Continue to trend LFTs  Liver serologies to rule out other causes of LFT elevation pending  Avoid hepatotoxins          Murray [place] PA-C

## 2021-04-15 NOTE — DISCHARGE INSTRUCTIONS
Anxiety   WHAT YOU SHOULD KNOW:   Anxiety is a condition that causes you to feel excessive worry, uneasiness, or fear  Family or work stress, smoking, caffeine, and alcohol can increase your risk for anxiety  Certain medicines or health conditions can also increase your risk  Anxiety may begin gradually, and can become a long-term condition if it is not managed or treated  AFTER YOU LEAVE:   Medicines:   · Medicines  can help you feel more calm and relaxed, and decrease your symptoms  · Take your medicine as directed  Contact your healthcare provider if you think your medicine is not helping or if you have side effects  Tell him if you are allergic to any medicine  Keep a list of the medicines, vitamins, and herbs you take  Include the amounts, and when and why you take them  Bring the list or the pill bottles to follow-up visits  Carry your medicine list with you in case of an emergency  Follow up with your healthcare provider within 2 weeks or as directed:  Write down your questions so you remember to ask them during your visits  Manage anxiety:   · Go to counseling as directed  Cognitive behavioral therapy can help you understand and change how you react to events that trigger your symptoms  · Find ways to manage your symptoms  Activities such as exercise, meditation, or listening to music can help you relax  · Practice deep breathing  Breathing can change how your body reacts to stress  Focus on taking slow, deep breaths several times a day, or during an anxiety attack  Breathe in through your nose, and out through your mouth  · Avoid caffeine  Caffeine can make your symptoms worse  Avoid foods or drinks that are meant to increase your energy level  · Limit or avoid alcohol  Ask your healthcare provider if alcohol is safe for you  You may not be able to drink alcohol if you take certain anxiety or depression medicines  Limit alcohol to 1 drink per day if you are a woman   Limit alcohol to 2 drinks per day if you are a man  A drink of alcohol is 12 ounces of beer, 5 ounces of wine, or 1½ ounces of liquor  Contact your healthcare provider if:   · Your symptoms get worse or do not get better with treatment  · You think your medicine may be causing side effects  · Your anxiety keeps you from doing your regular daily activities  · You have new symptoms since your last visit  · You have questions or concerns about your condition or care  Seek care immediately or call 911 if:   · You have chest pain, tightness, or heaviness that may spread to your shoulders, arms, jaw, neck, or back  · You feel like hurting yourself or someone else  · You feel dizzy, lightheaded, or faint  © 2014 [address] [employer] is for End User's use only and may not be sold, redistributed or otherwise used for commercial purposes  All illustrations and images included in CareNotes® are the copyrighted property of A D A M , Inc  or Jonas Justice  The above information is an  only  It is not intended as medical advice for individual conditions or treatments  Talk to your doctor, nurse or pharmacist before following any medical regimen to see if it is safe and effective for you

## 2021-04-15 NOTE — CASE MANAGEMENT
Call made to Chloe Closs at [facility] outpatient clinic, informed him of pt's d/c plan to [facility] on 4/15 and need for suboxone maintenance appt to be scheduled prior to pt's admission to rehab  Chloe Closs reports CM must speak with Cara Mcclain, tel# [phone]  Call made to Cara Mcclain, no answer and no voicemail  Call made to Chloe Closs, informed him of inability to contact Sherita Palomino Chloe Closs to [employer] and provide CM's contact info

## 2021-04-15 NOTE — CASE MANAGEMENT
Pt's d/c meds picked up from [address], cost $1  Pt does not have means of payment; financial assist form completed and meds picked up

## 2021-04-15 NOTE — CASE MANAGEMENT
CM rec'd call from [facility] ; informed her of [facility] inpt rehab requesting pt have confirmed suboxone follow up  [facility] reports pt would be able to be seen and followed although unable to provide appt at this time due to unknown length of stay at rehab  [facility] requesting that [address] [place] [address] Street contact her at #[phone] one week prior to d/c from inpt rehab to schedule level of Care Assessment and MAT  Call made to [facility], spoke with Deanne Handing, provided information above  CM also requested earlier transportation time  CM informed transport was changed to 9am   CM notified Charenton Handing that unit was not made aware of 9am pick and pt is not ready   Transport pick time to remain at 430pm

## 2021-04-15 NOTE — DISCHARGE SUMMARY
MEDICAL DETOX UNIT, LEVEL 4  Department of Medical Toxicology  Reason for Admission/Principal Problem: Opioid withdrawal  Admitting provider: DO Evette Smith MD  4/12/2021  3:29 AM       Discharging Physician / Practitioner: Comfort Mace DO  PCP: Travon Salcedo  Admission Date:   Admission Orders (From admission, onward)     Ordered        04/12/21 0447  Inpatient Admission  Once                   Discharge Date: 04/15/21    Resolved Problems  Date Reviewed: 4/15/2021          Resolved    Insomnia 4/15/2021     Resolved by  Comfort Mace DO          Consultations During Hospital Stay:  · Gastroenterology    Procedures Performed:   · None    Significant Findings / Test Results:   · Hepatitis panel - Hepatitis C Ab high reactivity  · CK - 1,134 on 4/12/21  · CK - 135 on 4/14/21  · AST//451 on 4/12/21    Incidental Findings:   · None    Test Results Pending at Discharge (will require follow up): · Hepatitis C RNA, quantitative, PCR  · Hepatitis C genotype  · Iron panel  · Ceruloplasmin  · Anti-mitochondrial antibody  · BHUMI screen with reflex to titer/pattern  · Anti-smooth muscle antibody, IgG  · Hepatic function panel     Outpatient Tests Requested:  · Polysomnogram    Complications:  None    Reason for Admission: Opioid withdrawal    Hospital Course: Myrna Duron is a 31 y o  male patient who originally presented to the hospital on 4/12/2021 due to opioid withdrawal after taking a full syringe of heroine and 0 5 oz of K2 and being given 10 mg Narcan in ambulance prior to arrival  Patient was admitted to detox unit where his COWS score was found to be zero  Patient did have elevated CK and started on IV fluids for Rhabdomyolysis   CK trended downward over the coming days and now normal  Patient reported taking Xanax off the streets and was restarted on Xanax 0 5 mg BID to prevent benzodiazepine withdrawal  LFTs were also elevated and laboratories investigating etiology revealed a high reactive Hep C antibody screen on acute hepatitis panel  GI was consulted who ordered additional labs for patient which will required follow up as outpatient  Patient was given Hep B vaccine  Hep A and follow up Hep B vaccines will needed to be obtained in follow up as well  CM established patient with new PCP for this  Liver ultrasound ordered and revealed a large gallstone with slight liver enlargement but otherwise unremarkable  Patient additional had one desaturation at night that resolved spontaneously and other concerning signs for sleep apenia including day time sleeping which will require follow up  Patient reported sleeping difficulties and was restarted on home trazodone which displayed significant improvement  Suboxone 8 mg QD was initiated on 4/14/21 and patient reported good tolerance  Patient will follow up for substance use with Katerin  Patient has  who informed patient he would need to go to rehab or violate parole, patient compliant and was discharged to [facility]  Please see above list of diagnoses and related plan for additional information  Condition at Discharge: stable     Discharge Day Visit / Exam:     Subjective:  Patient seen at bedside  Reports his mood is "great" and states he is ready for discharge to rehab  Denies any anxiety or depression today  Denies side effects to medications and report she "loves the suboxone " States sleep is much better on trazodone and got 6 hours last night  Eating and having BMs with difficulty  Report he understand need to follow up for Hep A, Hep B, and sleep study     Vitals: Blood Pressure: 142/64 (04/15/21 0703)  Pulse: 67 (04/15/21 0703)  Temperature: 98 7 °F (37 1 °C) (04/15/21 0703)  Temp Source: Temporal (04/15/21 0703)  Respirations: 18 (04/15/21 0703)  Height: 6' (182 9 cm) (04/12/21 0522)  Weight - Scale: 134 kg (295 lb) (04/12/21 0522)  SpO2: 93 % (04/15/21 0703)  Exam:   Physical Exam  Vitals signs and nursing note reviewed  Constitutional:       Appearance: Normal appearance  He is obese  He is not diaphoretic  HENT:      Head: Normocephalic and atraumatic  Nose: Nose normal    Eyes:      General: No scleral icterus  Extraocular Movements: Extraocular movements intact  Conjunctiva/sclera: Conjunctivae normal       Pupils: Pupils are equal, round, and reactive to light  Neck:      Musculoskeletal: Normal range of motion  Cardiovascular:      Rate and Rhythm: Normal rate and regular rhythm  Pulses: Normal pulses  Heart sounds: Normal heart sounds  Pulmonary:      Effort: Pulmonary effort is normal       Breath sounds: Normal breath sounds  No wheezing or rales  Abdominal:      General: Abdomen is flat  Bowel sounds are normal       Palpations: Abdomen is soft  Tenderness: There is no abdominal tenderness  There is no guarding  Musculoskeletal: Normal range of motion  Skin:     General: Skin is warm and dry  Neurological:      Mental Status: He is alert and oriented to person, place, and time  Psychiatric:         Mood and Affect: Mood normal          Behavior: Behavior normal          Thought Content: Thought content normal          Judgment: Judgment normal        Discussion with Family:  Discussed current plan with patient, answered all questions to best of my ability  Discharge instructions/Information to patient and family:   See after visit summary for information provided to patient and family  Provisions for Follow-Up Care:  See after visit summary for information related to follow-up care and any pertinent home health orders  Disposition:     Acute Rehab at [facility]    Planned Readmission: None     Discharge Statement:  I spent 30 minutes discharging the patient  This time was spent on the day of discharge  I had direct contact with the patient on the day of discharge   Greater than 50% of the total time was spent examining patient, answering all patient questions, arranging and discussing plan of care with patient as well as directly providing post-discharge instructions  Additional time then spent on discharge activities  Discharge Medications:  See after visit summary for reconciled discharge medications provided to patient and family        ** Please Note: This note has been constructed using a voice recognition system **

## 2021-04-15 NOTE — CASE MANAGEMENT
Pt requesting CM notify his aunt Deedee Carey tel# [phone] of d/c today and plans  Call made to Deedee Carey, provided d/c info  Deedee Carey denies any questions or concerns regarding pt's d/c plan and d/c today

## 2021-04-15 NOTE — NURSING NOTE
Discharge papers given to patient along with prescriptions for rehab unit  Script bag stapled closed  IV d/c with catheter intact  Escorted to the lobby for ride to take him to rehab  Dressed in street clothes  Denies questions

## 2021-04-15 NOTE — CASE MANAGEMENT
CM informed pt will require new PCP  CM met with pt, reviewed need for new PCP for follow up  Pt requesting PCP in [place]  Pt scheduled for new patient appointment with [address] [place] [address] [address], Fri may 7th at 11am with KIRTI Rasmussen and Dr Mic Peters  Pt notified of appt and need to contact [facility] to provide new PCP info  Cm provided pt with contact info for [facility] and new PCP info  Pt currently calling [employer] from room phone

## 2021-04-16 LAB
ACTIN IGG SERPL-ACNC: 9 UNITS (ref 0–19)
CERULOPLASMIN SERPL-MCNC: 24 MG/DL (ref 16–31)
MITOCHONDRIA M2 IGG SER-ACNC: 22.7 UNITS (ref 0–20)
RYE IGE QN: NEGATIVE

## 2021-04-17 LAB
HCV GENTYP SERPL NAA+PROBE: NORMAL
HCV PLEASE NOTE: NORMAL

## 2021-07-05 ENCOUNTER — HOSPITAL ENCOUNTER (EMERGENCY)
Facility: HOSPITAL | Age: 31
Discharge: HOME | End: 2021-07-05
Attending: EMERGENCY MEDICINE | Admitting: EMERGENCY MEDICINE
Payer: COMMERCIAL

## 2021-07-05 VITALS
TEMPERATURE: 98.7 F | DIASTOLIC BLOOD PRESSURE: 55 MMHG | SYSTOLIC BLOOD PRESSURE: 109 MMHG | RESPIRATION RATE: 18 BRPM | OXYGEN SATURATION: 100 % | HEART RATE: 79 BPM

## 2021-07-05 DIAGNOSIS — F19.10 DRUG ABUSE (HCC): Primary | ICD-10-CM

## 2021-07-05 LAB
AMPHETAMINES SERPL QL SCN: NEGATIVE
BARBITURATES UR QL: NEGATIVE
BENZODIAZ UR QL: NEGATIVE
COCAINE UR QL: POSITIVE
ETHANOL EXG-MCNC: 0 MG/DL
METHADONE UR QL: NEGATIVE
OPIATES UR QL SCN: POSITIVE
OXYCODONE+OXYMORPHONE UR QL SCN: NEGATIVE
PCP UR QL: NEGATIVE
SARS-COV-2 RNA RESP QL NAA+PROBE: NEGATIVE
THC UR QL: POSITIVE

## 2021-07-05 PROCEDURE — U0003 INFECTIOUS AGENT DETECTION BY NUCLEIC ACID (DNA OR RNA); SEVERE ACUTE RESPIRATORY SYNDROME CORONAVIRUS 2 (SARS-COV-2) (CORONAVIRUS DISEASE [COVID-19]), AMPLIFIED PROBE TECHNIQUE, MAKING USE OF HIGH THROUGHPUT TECHNOLOGIES AS DESCRIBED BY CMS-2020-01-R: HCPCS | Performed by: EMERGENCY MEDICINE

## 2021-07-05 PROCEDURE — U0005 INFEC AGEN DETEC AMPLI PROBE: HCPCS | Performed by: EMERGENCY MEDICINE

## 2021-07-05 PROCEDURE — 82075 ASSAY OF BREATH ETHANOL: CPT | Performed by: EMERGENCY MEDICINE

## 2021-07-05 PROCEDURE — 99282 EMERGENCY DEPT VISIT SF MDM: CPT | Performed by: EMERGENCY MEDICINE

## 2021-07-05 PROCEDURE — 80307 DRUG TEST PRSMV CHEM ANLYZR: CPT | Performed by: EMERGENCY MEDICINE

## 2021-07-05 PROCEDURE — 99284 EMERGENCY DEPT VISIT MOD MDM: CPT

## 2021-07-05 NOTE — ED CARE HANDOFF
Emergency Department Sign Out Note        Sign out and transfer of care from [facility]  See Separate Emergency Department note  The patient, Joe Soto, was evaluated by the previous provider for polysubstance abuse  Workup Completed:  HOST consult    ED Course / Workup Pending (followup): They found a bed for the patient upon discharge this afternoon  Procedures  MDM    Disposition  Final diagnoses:   Drug abuse ([facility] )     Time reflects when diagnosis was documented in both MDM as applicable and the Disposition within this note     Time User Action Codes Description Comment    7/5/2021  5:39 AM Rome Ahle M Add [F19 10] Drug abuse ([facility] )     7/5/2021  5:39 AM Audi Ahle M Add [F10 929] Alcohol intoxication ([facility] )     7/5/2021  6:35 AM Audi Ahle M Remove [F10 929] Alcohol intoxication [facility])       ED Disposition     ED Disposition Condition Date/Time Comment    Discharge Stable Mon Jul 5, 2021  9:36 AM Joe Soto discharge to home/self care              Follow-up Information     Follow up With Specialties Details Why Contact Info    Travon Zhang Nurse Practitioner Schedule an appointment as soon as possible for a visit in 1 week If symptoms worsen [address]  [facility] [address]  [phone]          Current Discharge Medication List      CONTINUE these medications which have NOT CHANGED    Details   ALPRAZolam (XANAX) 0 5 mg tablet Take 1 tablet (0 5 mg total) by mouth 2 (two) times a day as needed for anxiety or sleep for up to 15 days  Qty: 15 tablet, Refills: 0    Associated Diagnoses: Anxiety      hepatitis B vaccine (ENGERIX-B) 20 mcg/mL Inject 1 mL into a muscle once for 1 dose  Qty: 1 mL, Refills: 0    Associated Diagnoses: Hepatitis C antibody test positive      QUEtiapine (SEROquel) 400 MG tablet Take 400 mg by mouth daily at bedtime      traZODone (DESYREL) 100 mg tablet Take 1 tablet (100 mg total) by mouth

## 2021-07-05 NOTE — ED PROVIDER NOTES
History  Chief Complaint   Patient presents with    Drug Problem     Pt was at a party  Pt used Crack, heroin, meth, k2 and xanax  Pt admits to drinking etoh tonight also  Pt is interested in getting help for his drug use  States he doesn't feel well with the combination he took tonight  32 yo male who has significant substance abuse history and between rehab and recovery house for past month was clean for 77 days until he used meth, crack cocaine, heroin, xanax and K2 today  Told recovery house he slipped up and they kicked him out  He came here asking for help  Denies SI or HI  History provided by:  Patient   used: No    Drug Problem  Severity:  Moderate  Onset quality:  Gradual  Duration:  1 day  Timing:  Constant  Chronicity:  Recurrent  Associated symptoms: no abdominal pain, no chest pain, no congestion, no diarrhea, no fever, no headaches, no loss of consciousness, no nausea, no rash, no shortness of breath, no sore throat, no vomiting and no wheezing        Prior to Admission Medications   Prescriptions Last Dose Informant Patient Reported? Taking? ALPRAZolam (XANAX) 0 5 mg tablet   No No   Sig: Take 1 tablet (0 5 mg total) by mouth 2 (two) times a day as needed for anxiety or sleep for up to 15 days   QUEtiapine (SEROquel) 400 MG tablet   Yes No   Sig: Take 400 mg by mouth daily at bedtime   hepatitis B vaccine (ENGERIX-B) 20 mcg/mL   No No   Sig: Inject 1 mL into a muscle once for 1 dose   traZODone (DESYREL) 100 mg tablet   No No   Sig: Take 1 tablet (100 mg total) by mouth daily at bedtime for 16 days      Facility-Administered Medications: None       Past Medical History:   Diagnosis Date    Allergic     Development delay     Obesity        Past Surgical History:   Procedure Laterality Date    EAR SURGERY         History reviewed  No pertinent family history  I have reviewed and agree with the history as documented      E-Cigarette/Vaping    E-Cigarette Use Current Every Day User      E-Cigarette/Vaping Substances     Social History     Tobacco Use    Smoking status: Former Smoker     Packs/day: 0 00    Smokeless tobacco: Never Used   Vaping Use    Vaping Use: Every day   Substance Use Topics    Alcohol use: Yes    Drug use: Yes     Types: MDMA (ecstacy), Methamphetamines, Fentanyl, Heroin, "Crack" cocaine       Review of Systems   Constitutional: Negative for chills and fever  HENT: Negative for congestion and sore throat  Eyes: Negative for visual disturbance  Respiratory: Negative for shortness of breath and wheezing  Cardiovascular: Negative for chest pain and palpitations  Gastrointestinal: Negative for abdominal pain, diarrhea, nausea and vomiting  Genitourinary: Negative for dysuria  Musculoskeletal: Negative for neck pain and neck stiffness  Skin: Negative for pallor and rash  Neurological: Negative for loss of consciousness and headaches  Psychiatric/Behavioral: Negative for confusion  The patient is nervous/anxious  All other systems reviewed and are negative  Physical Exam  Physical Exam  Vitals and nursing note reviewed  Constitutional:       General: He is not in acute distress  Appearance: He is well-developed  HENT:      Head: Normocephalic and atraumatic  Right Ear: External ear normal       Left Ear: External ear normal       Mouth/Throat:      Mouth: Mucous membranes are moist    Eyes:      Extraocular Movements: Extraocular movements intact  Cardiovascular:      Rate and Rhythm: Normal rate and regular rhythm  Heart sounds: No murmur heard  Pulmonary:      Effort: Pulmonary effort is normal       Breath sounds: Normal breath sounds  Abdominal:      General: Bowel sounds are normal  There is no distension  Palpations: Abdomen is soft  Tenderness: There is no abdominal tenderness  Musculoskeletal:         General: Normal range of motion  Cervical back: Neck supple     Skin: General: Skin is warm  Coloration: Skin is not pale  Findings: No rash  Neurological:      Mental Status: He is alert and oriented to person, place, and time  Psychiatric:         Behavior: Behavior normal       Comments: Denies SI or HI         Vital Signs  ED Triage Vitals [07/05/21 0056]   Temperature Pulse Respirations Blood Pressure SpO2   98 7 °F (37 1 °C) (!) 112 18 137/86 96 %      Temp Source Heart Rate Source Patient Position - Orthostatic VS BP Location FiO2 (%)   Oral Monitor Sitting Right arm --      Pain Score       9           Vitals:    07/05/21 0056 07/05/21 0400 07/05/21 0500   BP: 137/86 (!) 102/49 91/54   Pulse: (!) 112 78 72   Patient Position - Orthostatic VS: Sitting Lying Lying         Visual Acuity      ED Medications  Medications - No data to display    Diagnostic Studies  Results Reviewed     Procedure Component Value Units Date/Time    Novel Coronavirus (Covid-19),PCR [facility] - 2 Hour Stat [[id number]]  (Normal) Collected: 07/05/21 0127    Lab Status: Final result Specimen: Nares from Nose Updated: 07/05/21 0225     SARS-CoV-2 Negative    Narrative: The specimen collection materials, transport medium, and/or testing methodology utilized in the production of these test results have been proven to be reliable in a limited validation with an abbreviated program under the Emergency Utilization Authorization provided by the FDA  Testing reported as "Presumptive positive" will be confirmed with secondary testing to ensure result accuracy  Clinical caution and judgement should be used with the interpretation of these results with consideration of the clinical impression and other laboratory testing  Testing reported as "Positive" or "Negative" has been proven to be accurate according to standard laboratory validation requirements  All testing is performed with control materials showing appropriate reactivity at standard intervals        POCT alcohol breath test [[id number]] (Normal) Resulted: 07/05/21 0125    Lab Status: Final result Updated: 07/05/21 0126     EXTBreath Alcohol 0 00    Rapid drug screen, urine [[id number]]     Lab Status: No result Specimen: Urine                  No orders to display              Procedures  Procedures         ED Course  ED Course as of Jul 05 0651   Mon Jul 05, 2021   0058 Pt seen and examined  32 yo male who has significant substance abuse history and between rehab and recovery house for past month was clean for 77 days until he used meth, crack cocaine, heroin, xanax and K2 today  Told recovery house he slipped up and they kicked him out  He came here asking for help  Denies SI or HI        1568 Pt signed out to Dr Migdalia Naik who will dispo with ED crisis and HOST  SBIRT 22yo+      Most Recent Value   SBIRT (22 yo +)   In order to provide better care to our patients, we are screening all of our patients for alcohol and drug use  Would it be okay to ask you these screening questions? Yes Filed at: 07/05/2021 3398                    MDM    Disposition  Final diagnoses:   Drug abuse [facility])     Time reflects when diagnosis was documented in both MDM as applicable and the Disposition within this note     Time User Action Codes Description Comment    7/5/2021  5:39 AM Fortunato CHUN Add [F19 10] Drug abuse ([facility] )     7/5/2021  5:39 AM Fortunato CHUN Add [F10 929] Alcohol intoxication ([facility] )     7/5/2021  6:35 AM Fortunato CHUN Remove [F10 929] Alcohol intoxication [facility])       ED Disposition     None      Follow-up Information    None         Patient's Medications   Discharge Prescriptions    No medications on file     No discharge procedures on file      PDMP Review     None          ED Provider  Electronically Signed by           Rosalie Lamar,   07/05/21 [address] [place], [place] [address]DO  07/05/21 0900

## 2021-07-05 NOTE — ED NOTES
Pt accepted at [facility], transportation arranged via HOST for [address] [place] [address], [facility]  07/05/21 8739

## 2022-06-03 ENCOUNTER — HOSPITAL ENCOUNTER (EMERGENCY)
Facility: HOSPITAL | Age: 32
Discharge: HOME | End: 2022-06-03
Attending: EMERGENCY MEDICINE | Admitting: EMERGENCY MEDICINE
Payer: COMMERCIAL

## 2022-06-03 VITALS
RESPIRATION RATE: 18 BRPM | BODY MASS INDEX: 42.43 KG/M2 | SYSTOLIC BLOOD PRESSURE: 141 MMHG | OXYGEN SATURATION: 99 % | TEMPERATURE: 97.9 F | DIASTOLIC BLOOD PRESSURE: 52 MMHG | HEART RATE: 74 BPM | WEIGHT: 312.83 LBS

## 2022-06-03 DIAGNOSIS — F19.10 POLYSUBSTANCE ABUSE (HCC): Primary | ICD-10-CM

## 2022-06-03 DIAGNOSIS — Z71.51 ENCOUNTER FOR DRUG REHABILITATION: ICD-10-CM

## 2022-06-03 DIAGNOSIS — F19.10 SUBSTANCE ABUSE (HCC): ICD-10-CM

## 2022-06-03 LAB
ETHANOL EXG-MCNC: 0 MG/DL
FLUAV RNA RESP QL NAA+PROBE: NEGATIVE
FLUBV RNA RESP QL NAA+PROBE: NEGATIVE
GLUCOSE SERPL-MCNC: 90 MG/DL (ref 65–140)
RSV RNA RESP QL NAA+PROBE: NEGATIVE
SARS-COV-2 RNA RESP QL NAA+PROBE: NEGATIVE

## 2022-06-03 PROCEDURE — 99285 EMERGENCY DEPT VISIT HI MDM: CPT

## 2022-06-03 PROCEDURE — 82075 ASSAY OF BREATH ETHANOL: CPT | Performed by: EMERGENCY MEDICINE

## 2022-06-03 PROCEDURE — 99282 EMERGENCY DEPT VISIT SF MDM: CPT | Performed by: EMERGENCY MEDICINE

## 2022-06-03 PROCEDURE — 82948 REAGENT STRIP/BLOOD GLUCOSE: CPT

## 2022-06-03 PROCEDURE — 0241U HB NFCT DS VIR RESP RNA 4 TRGT: CPT | Performed by: EMERGENCY MEDICINE

## 2022-06-03 NOTE — ED NOTES
Voicemail left for HOST requesting assessment in AM  Crisis and ED nurses station phone numbers provided  AM crisis to follow up

## 2022-06-03 NOTE — ED CARE HANDOFF
Emergency Department Sign Out Note        Sign out and transfer of care from Dr Jeffery Guillen  See Separate Emergency Department note  The patient, Susy Wilhelm, was evaluated by the previous provider for substance abuse  Workup Completed:  Medical clearance for  rehab    ED Course / Workup Pending (followup): Accepted and trasnported to [facility] rehab                                  ED Course as of 06/03/22 1202   Fri Jun 03, 2022   [address] [place] [address] Accepted to [facility],  time around noon     Procedures  MDM        Disposition  Final diagnoses:   Polysubstance abuse ([facility] )   Encounter for drug rehabilitation   Substance abuse ([facility] )     Time reflects when diagnosis was documented in both MDM as applicable and the Disposition within this note     Time User Action Codes Description Comment    6/3/2022  2:59 AM Mary Suzan Add [F19 10] Polysubstance abuse ([facility] )     6/3/2022  2:59 AM Mary Suzan Add [E56 1] Vitamin K2 deficiency     6/3/2022  2:59 AM Bartolo Corea Remove [E56 1] Vitamin K2 deficiency     6/3/2022  3:00 AM Mary Suzan Add [Z71 51] Encounter for drug rehabilitation     6/3/2022 11:57 AM Shilpa Rodríguez Add [F19 10] Substance abuse [facility])       ED Disposition     ED Disposition   Discharge    Condition   Stable    Date/Time   Fri Pee 3, 2022 11:57 AM    Comment   Susy Wilhelm discharge to [facility] rehab         Follow-up Information     Follow up With Specialties Details Why [place] [facility]   [address] [address] [place] [address] Ave  [phone]          Patient's Medications   Discharge Prescriptions    No medications on file     No discharge procedures on file         ED Provider  Electronically Signed by     Liana Rahman MD  06/03/22 6328

## 2022-06-03 NOTE — ED NOTES
The patient completed his prescreen with Katerin  He was accepted to the [facility] location  A  will pick him up at approximately 1200

## 2022-06-03 NOTE — CERTIFIED RECOVERY SPECIALIST
Certified  Note    Patient name: Katie Farias  Location: ED 10/ED [address]  Attending:  Jamia Olson MD MRN [id number]  : 1990  Age: 36 y o  Sex: male Date 6/3/2022         Substance Use History:     Social History     Substance and Sexual Activity   Alcohol Use Yes        Social History     Substance and Sexual Activity   Drug Use Yes    Types: MDMA (ecstacy), Methamphetamines, Fentanyl, Heroin, "Crack" cocaine     CRS attempted contact with patient  Patient was asleep  Provided recovery resources in room        Alva [place]

## 2022-06-03 NOTE — ED NOTES
Report and transfer of care to myself at this time  Sleeping quietly on litter       Maggy Coleman, LETHA  06/03/22 2315

## 2022-06-03 NOTE — ED NOTES
Call received from [facility] from HOST  She stated a bed is on hold at [facility]  Patient is currently speaking by phone to their Admissions department for a pre-screen assessment

## 2022-06-03 NOTE — ED NOTES
This RN provided patient with wireless phone to speak with HOST       Cuauhtemoc Weinberg RN  06/03/22 0017

## 2022-06-03 NOTE — ED NOTES
Patient is resting comfortably   No complaints offered, denies requests for foods and drinks      Jenna Arana, LETHA  06/03/22 4905

## 2022-06-03 NOTE — ED PROVIDER NOTES
History  Chief Complaint   Patient presents with    Overdose - Accidental     Patient thought he was smoking some marijuana tonight but instead did some crack and K-2, now is not feeling well and has a headache  Has not consumed any drugs in over one year  HPI     35 yo M hx of developmental delay, homelessness, opioid abuse, presents to ed for rehab  States he thought he was smoking thc, but had crack and k2 instead  He got a mild headache, and now wants rehab  States he hasnt had substance abuse for the past year, but then later admits to doing meth often, last use 5 days ago  Denies alcohol abuse/withdrawal  Heroin use one year ago  No si hi hallucinations  No other complaints on ros  Is "bouncing from house to house" he states at this time  Prior to Admission Medications   Prescriptions Last Dose Informant Patient Reported? Taking? ALPRAZolam (XANAX) 0 5 mg tablet   No No   Sig: Take 1 tablet (0 5 mg total) by mouth 2 (two) times a day as needed for anxiety or sleep for up to 15 days   QUEtiapine (SEROquel) 400 MG tablet   Yes No   Sig: Take 400 mg by mouth daily at bedtime   traZODone (DESYREL) 100 mg tablet   No No   Sig: Take 1 tablet (100 mg total) by mouth daily at bedtime for 16 days      Facility-Administered Medications: None       Past Medical History:   Diagnosis Date    Allergic     Development delay     Obesity        Past Surgical History:   Procedure Laterality Date    EAR SURGERY         History reviewed  No pertinent family history  I have reviewed and agree with the history as documented  E-Cigarette/Vaping    E-Cigarette Use Current Every Day User      E-Cigarette/Vaping Substances     Social History     Tobacco Use    Smoking status: Former Smoker     Packs/day: 0 00    Smokeless tobacco: Never Used   Vaping Use    Vaping Use: Every day   Substance Use Topics    Alcohol use:  Yes    Drug use: Yes     Types: MDMA (ecstacy), Methamphetamines, Fentanyl, Heroin, "Crack" cocaine       Review of Systems   Constitutional: Negative for chills, fatigue and fever  HENT: Negative for nosebleeds and sore throat  Eyes: Negative for redness and visual disturbance  Respiratory: Negative for shortness of breath and wheezing  Cardiovascular: Negative for chest pain and palpitations  Gastrointestinal: Negative for abdominal pain and diarrhea  Endocrine: Negative for polyuria  Genitourinary: Negative for difficulty urinating and testicular pain  Musculoskeletal: Negative for back pain and neck stiffness  Skin: Negative for rash and wound  Neurological: Negative for seizures, speech difficulty, light-headedness and numbness  Psychiatric/Behavioral: Negative for dysphoric mood and hallucinations  All other systems reviewed and are negative  Physical Exam  Physical Exam  Vitals and nursing note reviewed  Constitutional:       Appearance: He is well-developed  HENT:      Head: Normocephalic and atraumatic  Right Ear: External ear normal       Left Ear: External ear normal    Eyes:      Conjunctiva/sclera: Conjunctivae normal    Cardiovascular:      Rate and Rhythm: Normal rate and regular rhythm  Heart sounds: Normal heart sounds  Pulmonary:      Effort: Pulmonary effort is normal       Breath sounds: Normal breath sounds  No wheezing  Chest:      Chest wall: No tenderness  Abdominal:      General: Bowel sounds are normal       Palpations: Abdomen is soft  Tenderness: There is no abdominal tenderness  There is no guarding  Musculoskeletal:         General: Normal range of motion  Cervical back: Normal range of motion  Skin:     General: Skin is warm and dry  Findings: No rash  Neurological:      General: No focal deficit present  Mental Status: He is alert and oriented to person, place, and time  Cranial Nerves: No cranial nerve deficit  Sensory: No sensory deficit  Motor: No abnormal muscle tone  Coordination: Coordination normal       Comments: oriented to person place timex x3         Vital Signs  ED Triage Vitals [06/03/22 0131]   Temperature Pulse Respirations Blood Pressure SpO2   97 6 °F (36 4 °C) 75 16 149/86 98 %      Temp Source Heart Rate Source Patient Position - Orthostatic VS BP Location FiO2 (%)   Tympanic Monitor Lying Left arm --      Pain Score       --           Vitals:    06/03/22 0131   BP: 149/86   Pulse: 75   Patient Position - Orthostatic VS: Lying         Visual Acuity      ED Medications  Medications - No data to display    Diagnostic Studies  Results Reviewed     Procedure Component Value Units Date/Time    COVID/FLU/RSV - 2 hour TAT [[id number]]  (Normal) Collected: 06/03/22 0214    Lab Status: Final result Specimen: Nares from Nose Updated: 06/03/22 0258     SARS-CoV-2 Negative     INFLUENZA A PCR Negative     INFLUENZA B PCR Negative     RSV PCR Negative    Narrative:      FOR PEDIATRIC PATIENTS - copy/paste COVID Guidelines URL to browser: [email or web address]    SARS-CoV-2 assay is a Nucleic Acid Amplification assay intended for the  qualitative detection of nucleic acid from SARS-CoV-2 in nasopharyngeal  swabs  Results are for the presumptive identification of SARS-CoV-2 RNA  Positive results are indicative of infection with SARS-CoV-2, the virus  causing COVID-19, but do not rule out bacterial infection or co-infection  with other viruses  Laboratories within the [place] and its  territories are required to report all positive results to the appropriate  public health authorities  Negative results do not preclude SARS-CoV-2  infection and should not be used as the sole basis for treatment or other  patient management decisions  Negative results must be combined with  clinical observations, patient history, and epidemiological information  This test has not been FDA cleared or approved      This test has been authorized by FDA under an Emergency Use Authorization  (EUA)  This test is only authorized for the duration of time the  declaration that circumstances exist justifying the authorization of the  emergency use of an in vitro diagnostic tests for detection of SARS-CoV-2  virus and/or diagnosis of COVID-19 infection under section 564(b)(1) of  the Act, 21 U  S C  916RUL-2(R)(3), unless the authorization is terminated  or revoked sooner  The test has been validated but independent review by FDA  and CLIA is pending  Test performed using [employer] GeneXpert: This RT-PCR assay targets N2,  a region unique to SARS-CoV-2  A conserved region in the E-gene was chosen  for pan-Sarbecovirus detection which includes SARS-CoV-2  POCT alcohol breath test [[id number]]  (Normal) Resulted: 06/03/22 0215    Lab Status: Final result Updated: 06/03/22 0215     EXTBreath Alcohol 0 00    Rapid drug screen, urine [[id number]]     Lab Status: No result Specimen: Urine     Fingerstick Glucose (POCT) [[id number]]  (Normal) Collected: 06/03/22 0145    Lab Status: Final result Updated: 06/03/22 0146     POC Glucose 90 mg/dl                  No orders to display              Procedures  Procedures         ED Course         MDM    Patient requesting rehab   Bat uds covid HOST referral  To speak with host in am  Signed out to Dr Reyna Goznalez in AM pending HOST eval     Disposition  Final diagnoses:   Polysubstance abuse ([facility] [address] )   Encounter for drug rehabilitation     Time reflects when diagnosis was documented in both MDM as applicable and the Disposition within this note     Time User Action Codes Description Comment    6/3/2022  2:59 AM Mary Suzan Add [F19 10] Polysubstance abuse ([facility] [address] )     6/3/2022  2:59 AM Mary Suzan Add [E56 1] Vitamin K2 deficiency     6/3/2022  2:59 AM Bartolo Corea Pulse Remove [E56 1] Vitamin K2 deficiency     6/3/2022  3:00 AM Mary Suzan Add [Z71 51] Encounter for drug rehabilitation       ED Disposition     None      Follow-up Information    None         Patient's Medications   Discharge Prescriptions    No medications on file       No discharge procedures on file      PDMP Review     None          ED Provider  Electronically Signed by           Mckenzie Crawford MD  06/03/22 1839

## 2022-06-03 NOTE — ED NOTES
Belongings checked by [facility], ED tech for any contraband, none found       [place] RN  06/03/22 1255